# Patient Record
Sex: FEMALE | Race: WHITE | Employment: PART TIME | URBAN - METROPOLITAN AREA
[De-identification: names, ages, dates, MRNs, and addresses within clinical notes are randomized per-mention and may not be internally consistent; named-entity substitution may affect disease eponyms.]

---

## 2017-03-07 ENCOUNTER — TRANSFERRED RECORDS (OUTPATIENT)
Dept: HEALTH INFORMATION MANAGEMENT | Facility: CLINIC | Age: 22
End: 2017-03-07

## 2017-03-07 LAB
CHLAMYDIA - HIM PATIENT REPORTED: NEGATIVE
PAP SMEAR - HIM PATIENT REPORTED: NEGATIVE

## 2017-03-10 ENCOUNTER — TRANSFERRED RECORDS (OUTPATIENT)
Dept: HEALTH INFORMATION MANAGEMENT | Facility: CLINIC | Age: 22
End: 2017-03-10

## 2017-04-04 ENCOUNTER — TRANSFERRED RECORDS (OUTPATIENT)
Dept: HEALTH INFORMATION MANAGEMENT | Facility: CLINIC | Age: 22
End: 2017-04-04

## 2017-06-17 ENCOUNTER — HEALTH MAINTENANCE LETTER (OUTPATIENT)
Age: 22
End: 2017-06-17

## 2018-02-21 ENCOUNTER — TRANSFERRED RECORDS (OUTPATIENT)
Dept: HEALTH INFORMATION MANAGEMENT | Facility: CLINIC | Age: 23
End: 2018-02-21

## 2018-02-21 LAB
C TRACH DNA SPEC QL PROBE+SIG AMP: NEGATIVE
N GONORRHOEA DNA SPEC QL PROBE+SIG AMP: NEGATIVE
SPECIMEN DESCRIP: NORMAL
SPECIMEN DESCRIPTION: NORMAL

## 2018-04-16 ENCOUNTER — OFFICE VISIT (OUTPATIENT)
Dept: PEDIATRICS | Facility: CLINIC | Age: 23
End: 2018-04-16
Payer: COMMERCIAL

## 2018-04-16 VITALS
HEIGHT: 61 IN | HEART RATE: 76 BPM | SYSTOLIC BLOOD PRESSURE: 100 MMHG | TEMPERATURE: 97.3 F | OXYGEN SATURATION: 99 % | WEIGHT: 105.3 LBS | DIASTOLIC BLOOD PRESSURE: 58 MMHG | BODY MASS INDEX: 19.88 KG/M2

## 2018-04-16 DIAGNOSIS — L70.0 ACNE VULGARIS: ICD-10-CM

## 2018-04-16 DIAGNOSIS — Z00.01 ENCOUNTER FOR GENERAL ADULT MEDICAL EXAMINATION WITH ABNORMAL FINDINGS: Primary | ICD-10-CM

## 2018-04-16 DIAGNOSIS — R53.83 OTHER FATIGUE: ICD-10-CM

## 2018-04-16 LAB
BASOPHILS # BLD AUTO: 0 10E9/L (ref 0–0.2)
BASOPHILS NFR BLD AUTO: 0.3 %
DIFFERENTIAL METHOD BLD: ABNORMAL
EOSINOPHIL # BLD AUTO: 0 10E9/L (ref 0–0.7)
EOSINOPHIL NFR BLD AUTO: 0.8 %
ERYTHROCYTE [DISTWIDTH] IN BLOOD BY AUTOMATED COUNT: 14.2 % (ref 10–15)
HCT VFR BLD AUTO: 37.6 % (ref 35–47)
HGB BLD-MCNC: 12 G/DL (ref 11.7–15.7)
LYMPHOCYTES # BLD AUTO: 1.3 10E9/L (ref 0.8–5.3)
LYMPHOCYTES NFR BLD AUTO: 36.2 %
MCH RBC QN AUTO: 26.5 PG (ref 26.5–33)
MCHC RBC AUTO-ENTMCNC: 31.9 G/DL (ref 31.5–36.5)
MCV RBC AUTO: 83 FL (ref 78–100)
MONOCYTES # BLD AUTO: 0.2 10E9/L (ref 0–1.3)
MONOCYTES NFR BLD AUTO: 5.9 %
NEUTROPHILS # BLD AUTO: 2 10E9/L (ref 1.6–8.3)
NEUTROPHILS NFR BLD AUTO: 56.8 %
PLATELET # BLD AUTO: 203 10E9/L (ref 150–450)
RBC # BLD AUTO: 4.52 10E12/L (ref 3.8–5.2)
WBC # BLD AUTO: 3.5 10E9/L (ref 4–11)

## 2018-04-16 PROCEDURE — 83550 IRON BINDING TEST: CPT | Performed by: PEDIATRICS

## 2018-04-16 PROCEDURE — 80053 COMPREHEN METABOLIC PANEL: CPT | Performed by: PEDIATRICS

## 2018-04-16 PROCEDURE — 85025 COMPLETE CBC W/AUTO DIFF WBC: CPT | Performed by: PEDIATRICS

## 2018-04-16 PROCEDURE — 99385 PREV VISIT NEW AGE 18-39: CPT | Performed by: PEDIATRICS

## 2018-04-16 PROCEDURE — 36415 COLL VENOUS BLD VENIPUNCTURE: CPT | Performed by: PEDIATRICS

## 2018-04-16 PROCEDURE — 82728 ASSAY OF FERRITIN: CPT | Performed by: PEDIATRICS

## 2018-04-16 PROCEDURE — 84443 ASSAY THYROID STIM HORMONE: CPT | Performed by: PEDIATRICS

## 2018-04-16 PROCEDURE — 83540 ASSAY OF IRON: CPT | Performed by: PEDIATRICS

## 2018-04-16 PROCEDURE — 99213 OFFICE O/P EST LOW 20 MIN: CPT | Mod: 25 | Performed by: PEDIATRICS

## 2018-04-16 PROCEDURE — 82306 VITAMIN D 25 HYDROXY: CPT | Performed by: PEDIATRICS

## 2018-04-16 RX ORDER — DESONIDE 0.5 MG/G
CREAM TOPICAL
COMMUNITY
Start: 2018-03-12 | End: 2020-08-12

## 2018-04-16 ASSESSMENT — ENCOUNTER SYMPTOMS
PARESTHESIAS: 1
PALPITATIONS: 0
NAUSEA: 1
SHORTNESS OF BREATH: 1
DIZZINESS: 1
ARTHRALGIAS: 1
MYALGIAS: 1
HEARTBURN: 1
DYSURIA: 0
COUGH: 1
FEVER: 0
FREQUENCY: 0
HEMATURIA: 0
CHILLS: 0
DIARRHEA: 1
JOINT SWELLING: 0
WEAKNESS: 1
ABDOMINAL PAIN: 1
EYE PAIN: 1
SORE THROAT: 1
HEADACHES: 1
NERVOUS/ANXIOUS: 1
HEMATOCHEZIA: 0
CONSTIPATION: 1

## 2018-04-16 ASSESSMENT — ANXIETY QUESTIONNAIRES
5. BEING SO RESTLESS THAT IT IS HARD TO SIT STILL: NOT AT ALL
3. WORRYING TOO MUCH ABOUT DIFFERENT THINGS: MORE THAN HALF THE DAYS
GAD7 TOTAL SCORE: 10
1. FEELING NERVOUS, ANXIOUS, OR ON EDGE: MORE THAN HALF THE DAYS
7. FEELING AFRAID AS IF SOMETHING AWFUL MIGHT HAPPEN: SEVERAL DAYS
IF YOU CHECKED OFF ANY PROBLEMS ON THIS QUESTIONNAIRE, HOW DIFFICULT HAVE THESE PROBLEMS MADE IT FOR YOU TO DO YOUR WORK, TAKE CARE OF THINGS AT HOME, OR GET ALONG WITH OTHER PEOPLE: SOMEWHAT DIFFICULT
6. BECOMING EASILY ANNOYED OR IRRITABLE: MORE THAN HALF THE DAYS
2. NOT BEING ABLE TO STOP OR CONTROL WORRYING: MORE THAN HALF THE DAYS

## 2018-04-16 ASSESSMENT — PATIENT HEALTH QUESTIONNAIRE - PHQ9: 5. POOR APPETITE OR OVEREATING: SEVERAL DAYS

## 2018-04-16 NOTE — MR AVS SNAPSHOT
After Visit Summary   4/16/2018    Marta Quintanilla    MRN: [id number]           Patient Information     Date Of Birth          1995        Visit Information        Provider Department      4/16/2018 11:20 AM Sania Kurtz MD [facility]        Today's Diagnoses     Other fatigue    -  1      Care Instructions    Will get your records.    Start with labwork for fatigue and GI symptoms.      Preventive Health Recommendations  Female Ages 18 to 25     Yearly exam:     See your health care provider every year in order to  o Review health changes.   o Discuss preventive care.    o Review your medicines if your doctor has prescribed any.      You should be tested each year for STDs (sexually transmitted diseases).       After age 20, talk to your provider about how often you should have cholesterol testing.      Starting at age 21, get a Pap test every three years. If you have an abnormal result, your doctor may have you test more often.      If you are at risk for diabetes, you should have a diabetes test (fasting glucose).     Shots:     Get a flu shot each year.     Get a tetanus shot every 10 years.     Consider getting the shot (vaccine) that prevents cervical cancer (Gardasil).    Nutrition:     Eat at least 5 servings of fruits and vegetables each day.    Eat whole-grain bread, whole-wheat pasta and brown rice instead of white grains and rice.    Talk to your provider about Calcium and Vitamin D.     Lifestyle    Exercise at least 150 minutes a week each week (30 minutes a day, 5 days a week). This will help you control your weight and prevent disease.    Limit alcohol to one drink per day.    No smoking.     Wear sunscreen to prevent skin cancer.    See your dentist every six months for an exam and cleaning.          Follow-ups after your visit        Who to contact     If you have questions or need follow up information about today's clinic visit or your schedule please  "contact [facility] directly at [phone].  Normal or non-critical lab and imaging results will be communicated to you by MyChart, letter or phone within 4 business days after the clinic has received the results. If you do not hear from us within 7 days, please contact the clinic through [employer] or phone. If you have a critical or abnormal lab result, we will notify you by phone as soon as possible.  Submit refill requests through [employer] or call your pharmacy and they will forward the refill request to us. Please allow 3 business days for your refill to be completed.          Additional Information About Your Visit        [employer] Information     [employer] gives you secure access to your electronic health record. If you see a primary care provider, you can also send messages to your care team and make appointments. If you have questions, please call your primary care clinic.  If you do not have a primary care provider, please call [phone] and they will assist you.        Care EveryWhere ID     This is your Care EveryWhere ID. This could be used by other organizations to access your [facility] medical records  [id number]        Your Vitals Were     Pulse Temperature Height Pulse Oximetry BMI (Body Mass Index)       76 97.3  F (36.3  C) (Oral) 5' 1\" (1.549 m) 99% 19.9 kg/m2        Blood Pressure from Last 3 Encounters:   04/16/18 100/58   07/10/14 102/64   05/24/14 90/60    Weight from Last 3 Encounters:   04/16/18 105 lb 4.8 oz (47.8 kg)   07/10/14 105 lb 11.2 oz (47.9 kg) (10 %)*   05/24/14 104 lb 1.6 oz (47.2 kg) (8 %)*     * Growth percentiles are based on CDC 2-20 Years data.              We Performed the Following     CBC with platelets differential     Comprehensive metabolic panel     Ferritin     Iron and iron binding capacity     TSH with free T4 reflex     Vitamin D Deficiency        Primary Care Provider Office Phone # Fax #    Sania Kurtz [phone] [place] [address]        Equal Access to Services     [facility] : Hadii aad ku hadmaynorroger Rivas, wagloria green, qacheco davis. So [facility] [phone].    ATENCIÓN: Si habla español, tiene a waters disposición servicios gratuitos de asistencia lingüística. Llame al [phone].    We comply with applicable federal civil rights laws and [place] laws. We do not discriminate on the basis of race, color, national origin, age, disability, sex, sexual orientation, or gender identity.            Thank you!     [facility]  [facility] your [facility]. Our goal is always to provide you with excellent care. Hearing back from our patients is one way we can continue to improve our services. Please take a few minutes to complete the written survey that you may receive in the mail after your visit with us. Thank you!             Your Updated Medication List - Protect others around you: Learn how to safely use, store and throw away your medicines at www.disposemymeds.org.          This list is accurate as of 4/16/18 12:27 PM.  Always use your most recent med list.                   Brand Name Dispense Instructions for use Diagnosis    clindamycin 1 % lotion    CLEOCIN T          desonide 0.05 % cream    DESOWEN      Other fatigue       tretinoin 0.025 % cream    RETIN-A

## 2018-04-16 NOTE — PATIENT INSTRUCTIONS
Will get your records.    Start with labwork for fatigue and GI symptoms.      Preventive Health Recommendations  Female Ages 18 to 25     Yearly exam:     See your health care provider every year in order to  o Review health changes.   o Discuss preventive care.    o Review your medicines if your doctor has prescribed any.      You should be tested each year for STDs (sexually transmitted diseases).       After age 20, talk to your provider about how often you should have cholesterol testing.      Starting at age 21, get a Pap test every three years. If you have an abnormal result, your doctor may have you test more often.      If you are at risk for diabetes, you should have a diabetes test (fasting glucose).     Shots:     Get a flu shot each year.     Get a tetanus shot every 10 years.     Consider getting the shot (vaccine) that prevents cervical cancer (Gardasil).    Nutrition:     Eat at least 5 servings of fruits and vegetables each day.    Eat whole-grain bread, whole-wheat pasta and brown rice instead of white grains and rice.    Talk to your provider about Calcium and Vitamin D.     Lifestyle    Exercise at least 150 minutes a week each week (30 minutes a day, 5 days a week). This will help you control your weight and prevent disease.    Limit alcohol to one drink per day.    No smoking.     Wear sunscreen to prevent skin cancer.    See your dentist every six months for an exam and cleaning.
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
## 2018-04-17 ENCOUNTER — MYC MEDICAL ADVICE (OUTPATIENT)
Dept: PEDIATRICS | Facility: CLINIC | Age: 23
End: 2018-04-17

## 2018-04-17 LAB
ALBUMIN SERPL-MCNC: 4.2 G/DL (ref 3.4–5)
ALP SERPL-CCNC: 53 U/L (ref 40–150)
ALT SERPL W P-5'-P-CCNC: 12 U/L (ref 0–50)
ANION GAP SERPL CALCULATED.3IONS-SCNC: 9 MMOL/L (ref 3–14)
AST SERPL W P-5'-P-CCNC: 13 U/L (ref 0–45)
BILIRUB SERPL-MCNC: 0.3 MG/DL (ref 0.2–1.3)
BUN SERPL-MCNC: 12 MG/DL (ref 7–30)
CALCIUM SERPL-MCNC: 9.2 MG/DL (ref 8.5–10.1)
CHLORIDE SERPL-SCNC: 104 MMOL/L (ref 94–109)
CO2 SERPL-SCNC: 24 MMOL/L (ref 20–32)
CREAT SERPL-MCNC: 0.75 MG/DL (ref 0.52–1.04)
DEPRECATED CALCIDIOL+CALCIFEROL SERPL-MC: 30 UG/L (ref 20–75)
FERRITIN SERPL-MCNC: 30 NG/ML (ref 12–150)
GFR SERPL CREATININE-BSD FRML MDRD: >90 ML/MIN/1.7M2
GLUCOSE SERPL-MCNC: 74 MG/DL (ref 70–99)
IRON SATN MFR SERPL: 16 % (ref 15–46)
IRON SERPL-MCNC: 57 UG/DL (ref 35–180)
POTASSIUM SERPL-SCNC: 4 MMOL/L (ref 3.4–5.3)
PROT SERPL-MCNC: 7.4 G/DL (ref 6.8–8.8)
SODIUM SERPL-SCNC: 137 MMOL/L (ref 133–144)
TIBC SERPL-MCNC: 350 UG/DL (ref 240–430)

## 2018-04-17 ASSESSMENT — ANXIETY QUESTIONNAIRES: GAD7 TOTAL SCORE: 10

## 2018-04-17 ASSESSMENT — PATIENT HEALTH QUESTIONNAIRE - PHQ9: SUM OF ALL RESPONSES TO PHQ QUESTIONS 1-9: 16

## 2018-04-18 ENCOUNTER — TRANSFERRED RECORDS (OUTPATIENT)
Dept: HEALTH INFORMATION MANAGEMENT | Facility: CLINIC | Age: 23
End: 2018-04-18

## 2018-04-18 LAB — TSH SERPL DL<=0.005 MIU/L-ACNC: 0.74 MU/L (ref 0.4–4)

## 2018-04-18 NOTE — TELEPHONE ENCOUNTER
Patient was just seen for a physical and she is due for a tetanus shot, per HM.  Advised patient to schedule a nurse only appointment to get this vaccine.  Estefany Valladares RN  Message handled by Nurse Triage.

## 2018-05-01 ENCOUNTER — TRANSFERRED RECORDS (OUTPATIENT)
Dept: HEALTH INFORMATION MANAGEMENT | Facility: CLINIC | Age: 23
End: 2018-05-01

## 2018-11-06 ENCOUNTER — NURSE TRIAGE (OUTPATIENT)
Dept: NURSING | Facility: CLINIC | Age: 23
End: 2018-11-06

## 2018-11-06 ENCOUNTER — OFFICE VISIT (OUTPATIENT)
Dept: URGENT CARE | Facility: URGENT CARE | Age: 23
End: 2018-11-06
Payer: COMMERCIAL

## 2018-11-06 VITALS
TEMPERATURE: 97.9 F | HEART RATE: 95 BPM | SYSTOLIC BLOOD PRESSURE: 111 MMHG | DIASTOLIC BLOOD PRESSURE: 69 MMHG | OXYGEN SATURATION: 96 %

## 2018-11-06 DIAGNOSIS — Z11.3 SCREEN FOR STD (SEXUALLY TRANSMITTED DISEASE): ICD-10-CM

## 2018-11-06 DIAGNOSIS — N90.89 VULVAR IRRITATION: Primary | ICD-10-CM

## 2018-11-06 DIAGNOSIS — R30.0 DYSURIA: ICD-10-CM

## 2018-11-06 LAB
ALBUMIN UR-MCNC: NEGATIVE MG/DL
APPEARANCE UR: CLEAR
BILIRUB UR QL STRIP: NEGATIVE
COLOR UR AUTO: YELLOW
GLUCOSE UR STRIP-MCNC: NEGATIVE MG/DL
HGB UR QL STRIP: NEGATIVE
KETONES UR STRIP-MCNC: NEGATIVE MG/DL
LEUKOCYTE ESTERASE UR QL STRIP: NEGATIVE
NITRATE UR QL: NEGATIVE
PH UR STRIP: 7 PH (ref 5–7)
SOURCE: NORMAL
SP GR UR STRIP: 1.01 (ref 1–1.03)
SPECIMEN SOURCE: NORMAL
UROBILINOGEN UR STRIP-ACNC: 0.2 EU/DL (ref 0.2–1)
WET PREP SPEC: NORMAL

## 2018-11-06 PROCEDURE — 99213 OFFICE O/P EST LOW 20 MIN: CPT | Performed by: FAMILY MEDICINE

## 2018-11-06 PROCEDURE — 87491 CHLMYD TRACH DNA AMP PROBE: CPT | Performed by: FAMILY MEDICINE

## 2018-11-06 PROCEDURE — 87210 SMEAR WET MOUNT SALINE/INK: CPT | Performed by: FAMILY MEDICINE

## 2018-11-06 PROCEDURE — 87591 N.GONORRHOEAE DNA AMP PROB: CPT | Performed by: FAMILY MEDICINE

## 2018-11-06 PROCEDURE — 81003 URINALYSIS AUTO W/O SCOPE: CPT | Performed by: FAMILY MEDICINE

## 2018-11-06 RX ORDER — FLUCONAZOLE 150 MG/1
150 TABLET ORAL ONCE
Qty: 1 TABLET | Refills: 0 | Status: SHIPPED | OUTPATIENT
Start: 2018-11-06 | End: 2018-11-06

## 2018-11-06 NOTE — MR AVS SNAPSHOT
After Visit Summary   11/6/2018    Marta Quintanilla    MRN: [id number]           Patient Information     Date Of Birth          1995        Visit Information        Provider Department      11/6/2018 12:30 PM Juli Valladares MD [facility]        Today's Diagnoses     Vulvar irritation    -  1    Dysuria        Screen for STD (sexually transmitted disease)           Follow-ups after your visit        Who to contact     If you have questions or need follow up information about today's clinic visit or your schedule please contact [facility] directly at [phone].  Normal or non-critical lab and imaging results will be communicated to you by MyChart, letter or phone within 4 business days after the clinic has received the results. If you do not hear from us within 7 days, please contact the clinic through [employer] or phone. If you have a critical or abnormal lab result, we will notify you by phone as soon as possible.  Submit refill requests through [employer] or call your pharmacy and they will forward the refill request to us. Please allow 3 business days for your refill to be completed.          Additional Information About Your Visit        MyChart Information     [employer] gives you secure access to your electronic health record. If you see a primary care provider, you can also send messages to your care team and make appointments. If you have questions, please call your primary care clinic.  If you do not have a primary care provider, please call [phone] and they will assist you.        Care EveryWhere ID     This is your Care EveryWhere ID. This could be used by other organizations to access your [facility] medical records  [id number]        Your Vitals Were     Pulse Temperature Pulse Oximetry             95 97.9  F (36.6  C) (Tympanic) 96%          Blood Pressure from Last 3 Encounters:   11/06/18 111/69   04/16/18 100/58   07/10/14 102/64    Weight from  Last 3 Encounters:   04/16/18 105 lb 4.8 oz (47.8 kg)   07/10/14 105 lb 11.2 oz (47.9 kg) (10 %)*   05/24/14 104 lb 1.6 oz (47.2 kg) (8 %)*     * Growth percentiles are based on [facility] 2-20 Years data.              We Performed the Following     Chlamydia trachomatis PCR     Neisseria gonorrhoeae PCR     UA reflex to Microscopic and Culture     Wet prep          Today's Medication Changes          These changes are accurate as of 11/6/18 11:59 PM.  If you have any questions, ask your nurse or doctor.               Start taking these medicines.        Dose/Directions    fluconazole 150 MG tablet   Commonly known as:  DIFLUCAN   Used for:  Vulvar irritation   Started by:  Juli Valladares MD        Dose:  150 mg   Take 1 tablet (150 mg) by mouth once for 1 dose   Quantity:  1 tablet   Refills:  0            Where to get your medicines      These medications were sent to [id number] - [place], [place] - [address] AT [address]  [address], [place] [address]     Phone:  [phone]     fluconazole 150 MG tablet                Primary Care Provider Office Phone # Fax #    Sania Kurtz [phone] [address] [place] [address]        Equal Access to Services     [facility]: Hadii aad ku hadasho Soomaali, waaxda luqadaha, qaybta kaalmada adeegyada, waxay evelynein hayaan mariam pichardo . So [facility] [phone].    ATENCIÓN: Si habla español, tiene a waters disposición servicios gratuitos de asistencia lingüística. Llame al [phone].    We comply with applicable federal civil rights laws and [place] laws. We do not discriminate on the basis of race, color, national origin, age, disability, sex, sexual orientation, or gender identity.            Thank you!     Thank you for choosing [facility] [place] [facility]  for your care. Our goal is always to provide you with excellent care. Hearing back from our patients is one way we can continue to  improve our services. Please take a few minutes to complete the written survey that you may receive in the mail after your visit with us. Thank you!             Your Updated Medication List - Protect others around you: Learn how to safely use, store and throw away your medicines at www.disposemymeds.org.          This list is accurate as of 11/6/18 11:59 PM.  Always use your most recent med list.                   Brand Name Dispense Instructions for use Diagnosis    clindamycin 1 % lotion    CLEOCIN T          desonide 0.05 % cream    DESOWEN      Other fatigue       fluconazole 150 MG tablet    DIFLUCAN    1 tablet    Take 1 tablet (150 mg) by mouth once for 1 dose    Vulvar irritation       tretinoin 0.025 % cream    RETIN-A

## 2018-11-06 NOTE — TELEPHONE ENCOUNTER
"Marta states that she was seen today at Urgent Care clinic. She states that she was given a prescription for Diflucan and now she has a new symptom of bleeding described as \"not enough to fill a pad but like a clot with white discharge in it.\"  \"Should I still take the medication?\"    FNA contacted [facility], Spoke with Chen who verified with Juli Valladares MD  To relay message to patient, \"please continue with taking the Diflucan tonight. The discahrge is likely breaking down the skin and that is why there may be a little bleeding.\"     Disposition: Provided message to Marta who verbalized understanding. RN advised to call back with any changes, worsening of symptoms, and questions or concerns.     Zuri Clemons RN/FNA      Reason for Disposition    [1] Caller requesting NON-URGENT health information AND [2] PCP's office is the best resource    Additional Information    Negative: [1] Caller is not with the adult (patient) AND [2] reporting urgent symptoms    Negative: Lab result questions    Negative: Medication questions    Negative: Caller cannot be reached by phone    Negative: Caller has already spoken to PCP or another triager    Negative: RN needs further essential information from caller in order to complete triage    Negative: Requesting regular office appointment    Protocols used: INFORMATION ONLY CALL-ADULT-      "

## 2018-11-07 LAB
C TRACH DNA SPEC QL NAA+PROBE: NEGATIVE
N GONORRHOEA DNA SPEC QL NAA+PROBE: NEGATIVE
SPECIMEN SOURCE: NORMAL
SPECIMEN SOURCE: NORMAL

## 2018-11-14 NOTE — PROGRESS NOTES
SUBJECTIVE:  Marta Quintanilla is a 23 year old female who presents with vaginal discomfort.    Onset of symptoms close to a week ago, gradually worsening since.     Pain:burning in and around vagina.     Vaginal bleeding: No      Vaginal symptoms: local irritation  No LMP recorded.    Trying treating what felt like a yeast infection with Monistat OTC, which helped a bit but now feels like symptoms are worse than before that treatment.    Past Medical History:   Diagnosis Date     Personal history of urinary (tract) infection     urology evaluation OK     Current Outpatient Prescriptions   Medication Sig Dispense Refill     clindamycin (CLEOCIN T) 1 % lotion   6     desonide (DESOWEN) 0.05 % cream        tretinoin (RETIN-A) 0.025 % cream        Social History     Social History     Marital status: Single     Spouse name: N/A     Number of children: N/A     Years of education: N/A     Occupational History     Not on file.     Social History Main Topics     Smoking status: Never Smoker     Smokeless tobacco: Never Used     Alcohol use Yes      Comment: rarely     Drug use: No     Sexual activity: Yes     Partners: Male     Other Topics Concern     Not on file     Social History Narrative       ROS:   No fevers.  No abdominal/pelvic pain.    OBJECTIVE:  /69 (BP Location: Right arm, Patient Position: Chair, Cuff Size: Adult Regular)  Pulse 95  Temp 97.9  F (36.6  C) (Tympanic)  SpO2 96%  Pt did self wet prep-- exam deferred.  GENERAL APPEARANCE: healthy, alert and no distress    UA negative.  Wet prep negative.  GC and chlamydia pending.    ASSESSMENT:  Vulvar irritation, suspect partially treated yeast infection  No evidence of UTI at this time.    PLAN:  Fluconazole 150 mg x 1 dose.  Follow up with primary MD if not improving over the next week.

## 2019-02-21 ENCOUNTER — OFFICE VISIT (OUTPATIENT)
Dept: PEDIATRICS | Facility: CLINIC | Age: 24
End: 2019-02-21
Payer: COMMERCIAL

## 2019-02-21 VITALS
OXYGEN SATURATION: 99 % | DIASTOLIC BLOOD PRESSURE: 52 MMHG | HEART RATE: 87 BPM | TEMPERATURE: 97.6 F | SYSTOLIC BLOOD PRESSURE: 98 MMHG | WEIGHT: 104 LBS | HEIGHT: 61 IN | BODY MASS INDEX: 19.63 KG/M2

## 2019-02-21 DIAGNOSIS — Z13.1 SCREENING FOR DIABETES MELLITUS: ICD-10-CM

## 2019-02-21 DIAGNOSIS — Z13.6 SCREENING FOR CARDIOVASCULAR CONDITION: ICD-10-CM

## 2019-02-21 DIAGNOSIS — Z00.00 ROUTINE GENERAL MEDICAL EXAMINATION AT A HEALTH CARE FACILITY: Primary | ICD-10-CM

## 2019-02-21 DIAGNOSIS — Z23 NEED FOR TETANUS BOOSTER: ICD-10-CM

## 2019-02-21 DIAGNOSIS — M79.641 PAIN IN BOTH HANDS: ICD-10-CM

## 2019-02-21 DIAGNOSIS — M79.642 PAIN IN BOTH HANDS: ICD-10-CM

## 2019-02-21 PROCEDURE — 90715 TDAP VACCINE 7 YRS/> IM: CPT | Performed by: PEDIATRICS

## 2019-02-21 PROCEDURE — 99395 PREV VISIT EST AGE 18-39: CPT | Performed by: PEDIATRICS

## 2019-02-21 PROCEDURE — 90471 IMMUNIZATION ADMIN: CPT | Performed by: PEDIATRICS

## 2019-02-21 ASSESSMENT — ENCOUNTER SYMPTOMS
HEMATOCHEZIA: 0
ARTHRALGIAS: 1
HEADACHES: 0
PALPITATIONS: 0
BREAST MASS: 1
DIZZINESS: 0
FEVER: 0
WEAKNESS: 0
EYE PAIN: 0
PARESTHESIAS: 0
COUGH: 0
CHILLS: 0
HEARTBURN: 1
NAUSEA: 0
DYSURIA: 0
JOINT SWELLING: 1
CONSTIPATION: 0
SHORTNESS OF BREATH: 0
ABDOMINAL PAIN: 0
HEMATURIA: 0
SORE THROAT: 0
FREQUENCY: 0
DIARRHEA: 0
MYALGIAS: 1
NERVOUS/ANXIOUS: 1

## 2019-02-21 ASSESSMENT — MIFFLIN-ST. JEOR: SCORE: 1164.12

## 2019-02-21 NOTE — NURSING NOTE
Screening Questionnaire for Adult Immunization    Are you sick today?   No   Do you have allergies to medications, food, a vaccine component or latex?   No   Have you ever had a serious reaction after receiving a vaccination?   No   Do you have a long-term health problem with heart disease, lung disease, asthma, kidney disease, metabolic disease (e.g. diabetes), anemia, or other blood disorder?   No   Do you have cancer, leukemia, HIV/AIDS, or any other immune system problem?   No   In the past 3 months, have you taken medications that affect  your immune system, such as prednisone, other steroids, or anticancer drugs; drugs for the treatment of rheumatoid arthritis, Crohn s disease, or psoriasis; or have you had radiation treatments?   No   Have you had a seizure, or a brain or other nervous system problem?   No   During the past year, have you received a transfusion of blood or blood     products, or been given immune (gamma) globulin or antiviral drug?   No   For women: Are you pregnant or is there a chance you could become        pregnant during the next month?   No   Have you received any vaccinations in the past 4 weeks?   No     Immunization questionnaire answers were all negative.        Per orders of Dr. Kurtz, injection of Kurtz given by Mackenzie Shannon. Patient instructed to remain in clinic for 15 minutes afterwards, and to report any adverse reaction to me immediately.       Screening performed by Mackenzie Shannon on 2/21/2019 at 10:37 AM.
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
## 2019-02-25 DIAGNOSIS — Z13.6 SCREENING FOR CARDIOVASCULAR CONDITION: ICD-10-CM

## 2019-02-25 DIAGNOSIS — Z13.1 SCREENING FOR DIABETES MELLITUS: ICD-10-CM

## 2019-02-25 LAB
CHOLEST SERPL-MCNC: 130 MG/DL
GLUCOSE SERPL-MCNC: 84 MG/DL (ref 70–99)
HDLC SERPL-MCNC: 76 MG/DL
LDLC SERPL CALC-MCNC: 48 MG/DL
NONHDLC SERPL-MCNC: 54 MG/DL
TRIGL SERPL-MCNC: 32 MG/DL

## 2019-02-25 PROCEDURE — 80061 LIPID PANEL: CPT | Performed by: PEDIATRICS

## 2019-02-25 PROCEDURE — 82947 ASSAY GLUCOSE BLOOD QUANT: CPT | Performed by: PEDIATRICS

## 2019-02-25 PROCEDURE — 36415 COLL VENOUS BLD VENIPUNCTURE: CPT | Performed by: PEDIATRICS

## 2019-02-26 ENCOUNTER — THERAPY VISIT (OUTPATIENT)
Dept: OCCUPATIONAL THERAPY | Facility: CLINIC | Age: 24
End: 2019-02-26
Attending: PEDIATRICS
Payer: COMMERCIAL

## 2019-02-26 DIAGNOSIS — M79.602 BILATERAL ARM PAIN: ICD-10-CM

## 2019-02-26 DIAGNOSIS — M79.641 PAIN IN BOTH HANDS: ICD-10-CM

## 2019-02-26 DIAGNOSIS — M79.641 BILATERAL HAND PAIN: ICD-10-CM

## 2019-02-26 DIAGNOSIS — M79.601 BILATERAL ARM PAIN: ICD-10-CM

## 2019-02-26 DIAGNOSIS — M79.642 PAIN IN BOTH HANDS: ICD-10-CM

## 2019-02-26 DIAGNOSIS — M79.642 BILATERAL HAND PAIN: ICD-10-CM

## 2019-02-26 PROCEDURE — 97166 OT EVAL MOD COMPLEX 45 MIN: CPT | Mod: GO | Performed by: OCCUPATIONAL THERAPIST

## 2019-02-26 PROCEDURE — 97110 THERAPEUTIC EXERCISES: CPT | Mod: GO | Performed by: OCCUPATIONAL THERAPIST

## 2019-02-26 NOTE — PROGRESS NOTES
Hand Therapy Initial Evaluation    Current Date:  2/26/2019    Diagnosis: B hand pain  Date of onset: 2 years ago  MD order date: 2/21/19     Subjective:  Marta Quintanilla is a 23 year old R hand dominant female.    Patient reports symptoms of pain, weakness/loss of strength, numbness and tingling  of the bilateral hands, R worse than left, which occurred due to working with her hands as an . Since onset symptoms are Unchanged, and depends on schedule at work.  Special tests:  none.  Previous treatment: OTC splints.    General health as reported by patient is good.  Pertinent medical history includes:Depression, Numbness/Tingling  Medical allergies:none.  Surgical history: other: wisdom teeth, ear tubes.  Medication history: None.    Occupational Profile Information:  Current occupation is an   Currently working in normal job without restrictions  Job Tasks: Lifting, Carrying, Prolonged Standing, Repetitive Tasks  Prior functional level:  no limitations  Barriers include:none  Mobility: No difficulty  Transportation: drives  Leisure activities/hobbies: household chores, carrying groceries    Functional Outcome Measure:   Upper Extremity Functional Index Score:  SCORE:   Column Totals: /80: 75   (A lower score indicates greater disability.)    Objective:  Pain Level Report: On scale 0-10/10  Date 2/26/2019    side B    At Rest 0-1    With Activity 7-8      Primary Report: location and description  Date 2/26/2019    Side B    Location Thumbs, pinkys, palms, up both sides of forearms, up to neck    Radiation Through forearms and neck    Pain Quality Dull, shooting, achy, sharp    Frequency Intermittant    Duration Day or night, difficult falling asleep when work is busy    Exacerbated by  massage, lifting grocery bags, opening doors, chopping vegetables    Relieved by Rest, CBD cream, stretching    Progression since onset Unchanged, ups an downs depending on job      Edema:  Pt notes that  fingers do appear thicker when work schedule is busy. No apparent swelling at time of evaluation.  Sensation: Pt notes tingling through palms of hands near A1s. Not sure if paresthesias progress to tips of fingers.    ROM:  Full AROM of wrists, fingers, and elbows. Pt reports thumbs get stiff at the end of a busy work week.    Special Tests:  Pain Report:  - none    + mild    ++ moderate    +++ severe   Date 2/26/19 2/26/19   Side L R   Phalens Mild pain at base of thumb on L -   Tinels at CT sore -   Tinels at Pronator Sensation in thumb -   Elbow flexion Dull pain at base of thumb -   Valentina Tingling, shooting into wrist tingling       STRENGTH: (Measured in pounds, pain scale 0-10/10)    Date 2/26/2019        Trials Left Right Left Right Left Right Left Right Left Right Left Right   1 30 33             2               3               Avg               Pain 1-2                3 Point Pinch  Date 2/26/2019        Trials Left Right Left Right Left Right Left Right Left Right Left Right   1 10 10             2               3               Avg               Pain                 Lateral Pinch  Date 2/26/2019        Trials Left Right Left Right Left Right Left Right Left Right Left Right   1 12 12.5             2               3               Avg               Pain                   Assessment:  Patient presents with symptoms consistent with diagnosis of B hand pain,  with conservative intervention.     Patient's limitations or Problem List includes:  Pain, Weakness, Decreased , Decreased pinch and Tightness in musculature of the bilateral hand, thumb, index finger, long finger, ring finger and small finger which interferes with the patient's ability to perform Self Care Tasks (weight bearing on hands), Work Tasks, Recreational Activities and Household Chores as compared to previous level of function.    Rehab Potential:  Excellent - Return to full activity, no limitations    Patient will benefit from skilled  Occupational Therapy to increase  strength, pinch strength and sensation and decrease pain to return to previous activity level and resume normal daily tasks and to reach their rehab potential.    Barriers to Learning:  No barrier    Communication Issues:  Patient appears to be able to clearly communicate and understand verbal and written communication and follow directions correctly.    Chart Review: Chart Review, Brief history including review of medical and/or therapy records relating to the presenting problem and Simple history review with patient    Identified Performance Deficits: home establishment and management, shopping, work and leisure activities    Assessment of Occupational Performance:  3-5 Performance Deficits    Clinical Decision Making (Complexity): Moderate complexity    Treatment Explanation:  The following has been discussed with the patient:  RX ordered/plan of care  Anticipated outcomes  Possible risks and side effects    Plan:  Frequency:  1 X week, once daily  Duration:  for 8 weeks    Treatment Plan:   Modalities:  US  Therapeutic Exercise:  AROM, Tendon Gliding, Isotonics and Isometrics  Neuromuscular re-education:  Nerve Gliding, Isometrics and Stabilization  Manual Techniques:  Myofascial release  Orthotic Fabrication:  Static orthosis  Discharge Plan:  Achieve all LTG.  Independent in home treatment program.  Reach maximal therapeutic benefit.    Home Exercise Program:  T-towel stretch  Pec clock stretch at the wall  Median n glide  Warmth PRN    Next Visit:  Massage to palm, thenars, teach for HEP  Nerve gliding  MFR/STM    Future: consider wrist splints, education on posture  - scapular retraction  -foam roller program

## 2019-03-05 ENCOUNTER — THERAPY VISIT (OUTPATIENT)
Dept: OCCUPATIONAL THERAPY | Facility: CLINIC | Age: 24
End: 2019-03-05
Payer: COMMERCIAL

## 2019-03-05 DIAGNOSIS — M79.602 BILATERAL ARM PAIN: ICD-10-CM

## 2019-03-05 DIAGNOSIS — M79.642 BILATERAL HAND PAIN: ICD-10-CM

## 2019-03-05 DIAGNOSIS — M79.641 BILATERAL HAND PAIN: ICD-10-CM

## 2019-03-05 DIAGNOSIS — M79.601 BILATERAL ARM PAIN: ICD-10-CM

## 2019-03-05 PROCEDURE — 97112 NEUROMUSCULAR REEDUCATION: CPT | Mod: GO | Performed by: OCCUPATIONAL THERAPIST

## 2019-03-05 PROCEDURE — 97140 MANUAL THERAPY 1/> REGIONS: CPT | Mod: GO | Performed by: OCCUPATIONAL THERAPIST

## 2019-03-05 PROCEDURE — 97110 THERAPEUTIC EXERCISES: CPT | Mod: GO | Performed by: OCCUPATIONAL THERAPIST

## 2019-03-12 ENCOUNTER — THERAPY VISIT (OUTPATIENT)
Dept: OCCUPATIONAL THERAPY | Facility: CLINIC | Age: 24
End: 2019-03-12
Payer: COMMERCIAL

## 2019-03-12 DIAGNOSIS — M79.641 BILATERAL HAND PAIN: ICD-10-CM

## 2019-03-12 DIAGNOSIS — M79.601 BILATERAL ARM PAIN: ICD-10-CM

## 2019-03-12 DIAGNOSIS — M79.602 BILATERAL ARM PAIN: ICD-10-CM

## 2019-03-12 DIAGNOSIS — M79.642 BILATERAL HAND PAIN: ICD-10-CM

## 2019-03-12 PROCEDURE — 97140 MANUAL THERAPY 1/> REGIONS: CPT | Mod: GO | Performed by: OCCUPATIONAL THERAPIST

## 2019-03-12 PROCEDURE — 97112 NEUROMUSCULAR REEDUCATION: CPT | Mod: GO | Performed by: OCCUPATIONAL THERAPIST

## 2019-03-12 PROCEDURE — 97110 THERAPEUTIC EXERCISES: CPT | Mod: GO | Performed by: OCCUPATIONAL THERAPIST

## 2019-03-19 ENCOUNTER — THERAPY VISIT (OUTPATIENT)
Dept: OCCUPATIONAL THERAPY | Facility: CLINIC | Age: 24
End: 2019-03-19
Payer: COMMERCIAL

## 2019-03-19 DIAGNOSIS — M79.641 BILATERAL HAND PAIN: ICD-10-CM

## 2019-03-19 DIAGNOSIS — M79.642 BILATERAL HAND PAIN: ICD-10-CM

## 2019-03-19 DIAGNOSIS — M79.602 BILATERAL ARM PAIN: ICD-10-CM

## 2019-03-19 DIAGNOSIS — M79.601 BILATERAL ARM PAIN: ICD-10-CM

## 2019-03-19 PROCEDURE — 97140 MANUAL THERAPY 1/> REGIONS: CPT | Mod: GO | Performed by: OCCUPATIONAL THERAPIST

## 2019-03-19 PROCEDURE — 97112 NEUROMUSCULAR REEDUCATION: CPT | Mod: GO | Performed by: OCCUPATIONAL THERAPIST

## 2019-03-19 PROCEDURE — 97110 THERAPEUTIC EXERCISES: CPT | Mod: GO | Performed by: OCCUPATIONAL THERAPIST

## 2019-03-25 ENCOUNTER — TRANSFERRED RECORDS (OUTPATIENT)
Dept: HEALTH INFORMATION MANAGEMENT | Facility: CLINIC | Age: 24
End: 2019-03-25

## 2019-04-02 ENCOUNTER — THERAPY VISIT (OUTPATIENT)
Dept: OCCUPATIONAL THERAPY | Facility: CLINIC | Age: 24
End: 2019-04-02
Payer: COMMERCIAL

## 2019-04-02 DIAGNOSIS — M79.642 BILATERAL HAND PAIN: ICD-10-CM

## 2019-04-02 DIAGNOSIS — M79.641 BILATERAL HAND PAIN: ICD-10-CM

## 2019-04-02 DIAGNOSIS — M79.601 BILATERAL ARM PAIN: ICD-10-CM

## 2019-04-02 DIAGNOSIS — M79.602 BILATERAL ARM PAIN: ICD-10-CM

## 2019-04-02 PROCEDURE — 97110 THERAPEUTIC EXERCISES: CPT | Mod: GO | Performed by: OCCUPATIONAL THERAPIST

## 2019-04-02 PROCEDURE — 97140 MANUAL THERAPY 1/> REGIONS: CPT | Mod: GO | Performed by: OCCUPATIONAL THERAPIST

## 2019-04-02 PROCEDURE — 97112 NEUROMUSCULAR REEDUCATION: CPT | Mod: GO | Performed by: OCCUPATIONAL THERAPIST

## 2019-04-09 ENCOUNTER — THERAPY VISIT (OUTPATIENT)
Dept: OCCUPATIONAL THERAPY | Facility: CLINIC | Age: 24
End: 2019-04-09
Payer: COMMERCIAL

## 2019-04-09 DIAGNOSIS — M79.642 BILATERAL HAND PAIN: ICD-10-CM

## 2019-04-09 DIAGNOSIS — M79.602 BILATERAL ARM PAIN: ICD-10-CM

## 2019-04-09 DIAGNOSIS — M79.641 BILATERAL HAND PAIN: ICD-10-CM

## 2019-04-09 DIAGNOSIS — M79.601 BILATERAL ARM PAIN: ICD-10-CM

## 2019-04-09 PROCEDURE — 97140 MANUAL THERAPY 1/> REGIONS: CPT | Mod: GO | Performed by: OCCUPATIONAL THERAPIST

## 2019-04-09 PROCEDURE — 97112 NEUROMUSCULAR REEDUCATION: CPT | Mod: GO | Performed by: OCCUPATIONAL THERAPIST

## 2019-04-09 PROCEDURE — 97110 THERAPEUTIC EXERCISES: CPT | Mod: GO | Performed by: OCCUPATIONAL THERAPIST

## 2019-04-23 ENCOUNTER — THERAPY VISIT (OUTPATIENT)
Dept: OCCUPATIONAL THERAPY | Facility: CLINIC | Age: 24
End: 2019-04-23
Payer: COMMERCIAL

## 2019-04-23 DIAGNOSIS — M79.602 BILATERAL ARM PAIN: ICD-10-CM

## 2019-04-23 DIAGNOSIS — M79.642 BILATERAL HAND PAIN: ICD-10-CM

## 2019-04-23 DIAGNOSIS — M79.601 BILATERAL ARM PAIN: ICD-10-CM

## 2019-04-23 DIAGNOSIS — M79.641 BILATERAL HAND PAIN: ICD-10-CM

## 2019-04-23 PROCEDURE — 97110 THERAPEUTIC EXERCISES: CPT | Mod: GO | Performed by: OCCUPATIONAL THERAPIST

## 2019-04-23 PROCEDURE — 97140 MANUAL THERAPY 1/> REGIONS: CPT | Mod: GO | Performed by: OCCUPATIONAL THERAPIST

## 2019-04-23 NOTE — PROGRESS NOTES
Progress Note - Hand Therapy  Reporting period is 2/26/19 to 4/23/19.  Current Date:  2/26/2019    Diagnosis: B hand pain  Date of onset: 2 years ago  MD order date: 2/21/19   Subjectve:   Subjective changes as noted by patient:  The hands are doing good, hardly any pain  Functional changes noted by patient:  Improvement in Self Care Tasks (eating), Work Tasks and Household Chores  Patient has noted adverse reaction to:  None    Objective:  Changes noted in objective findings: Yes, see below    Pain Level Report: On scale 0-10/10  Date 2/26/2019 4/22/19   side B B   At Rest 0-1 0/10   With Activity 7-8 0-1/10     Primary Report: location and description  Date 2/26/2019 4/22/19   Side B B   Location Thumbs, pinkys, palms, up both sides of forearms, up to neck Thenars, dorsal hands   Radiation Through forearms and neck None   Pain Quality Dull, shooting, achy, sharp Dull, numb   Frequency Intermittant Intermittant   Duration Day or night, difficult falling asleep when work is busy Day   Exacerbated by  massage, lifting grocery bags, opening doors, chopping vegetables work   Relieved by Rest, CBD cream, stretching exercises   Progression since onset Unchanged, ups an downs depending on job Gradually better     Edema:  Pt notes that fingers do appear thicker when work schedule is busy. No apparent swelling at time of evaluation.    Sensation: Pt notes tingling through palms of hands near A1s. Not sure if paresthesias progress to tips of fingers.  4/22/19: very intermittant tingling    ROM:  Full AROM of wrists, fingers, and elbows. Pt reports thumbs get stiff at the end of a busy work week.    Special Tests:  Pain Report:  - none    + mild    ++ moderate    +++ severe   Date 2/26/19 2/26/19   Side L R   Phalens Mild pain at base of thumb on L -   Tinels at CT sore -   Tinels at Pronator Sensation in thumb -   Elbow flexion Dull pain at base of thumb -   Valentina Tingling, shooting into wrist tingling       STRENGTH:  (Measured in pounds, pain scale 0-10/10)    Date 2/26/2019 4/22/19       Trials Left Right Left Right Left Right Left Right Left Right Left Right   1 30 33 45 42           2               3               Avg               Pain 1-2  0 0             3 Point Pinch  Date 2/26/2019 4/22/19       Trials Left Right Left Right Left Right Left Right Left Right Left Right   1 10 10 11 12           2               3               Avg               Pain   0 0             Lateral Pinch  Date 2/26/2019 4/22/19       Trials Left Right Left Right Left Right Left Right Left Right Left Right   1 12 12.5 13 15           2               3               Avg               Pain                   Assessment:  Response to therapy has been improvement to:  Strength:   and pinch  Pain:  frequency is less, intensity of pain is decreased, duration of pain is decreased and less tender over affected area  Paresthesias:  Median nerve - less intense numbness and tingling    Overall Assessment:  Patient's symptoms are resolving.  Patient is progressing well and is ready to decrease frequency of treatment in the clinic.  Patient is becoming more independent in home exercise program  Patient would benefit from continued therapy to achieve rehab potential  STG/LTG:  STGoals have been reviewed;  see goal sheet for details and updates.  LTGoals have been reviewed;  see goal sheet for details and updates.    I have re-evaluated this patient and find that the nature, scope, duration and intensity of the therapy is appropriate for the medical condition of the patient.    Plan:  Frequency:  1 X week, once daily, 1-2x per month  Duration:  for 4 months    Treatment Plan:   Modalities:  US  Therapeutic Exercise:  AROM, Tendon Gliding, Isotonics and Isometrics  Neuromuscular re-education:  Nerve Gliding, Isometrics and Stabilization  Manual Techniques:  Myofascial release  Orthotic Fabrication:  Static orthosis  Discharge Plan:  Achieve all  LTG.  Independent in home treatment program.  Reach maximal therapeutic benefit.    Home Exercise Program:  T-towel stretch  Pec clock stretch at the wall  Median n glide, rad nerve glide  CMC stabilization  Massage to forearms, thenars  Warmth PRN  Scapular stability, strengthening    Next Visit:  Massage to palm, thenars  Nerve gliding  MFR/STM

## 2019-06-25 ENCOUNTER — THERAPY VISIT (OUTPATIENT)
Dept: OCCUPATIONAL THERAPY | Facility: CLINIC | Age: 24
End: 2019-06-25
Payer: COMMERCIAL

## 2019-06-25 DIAGNOSIS — M79.601 BILATERAL ARM PAIN: ICD-10-CM

## 2019-06-25 DIAGNOSIS — M79.602 BILATERAL ARM PAIN: ICD-10-CM

## 2019-06-25 DIAGNOSIS — M79.641 BILATERAL HAND PAIN: ICD-10-CM

## 2019-06-25 DIAGNOSIS — M79.642 BILATERAL HAND PAIN: ICD-10-CM

## 2019-06-25 PROCEDURE — 97535 SELF CARE MNGMENT TRAINING: CPT | Mod: GO | Performed by: OCCUPATIONAL THERAPIST

## 2019-06-25 PROCEDURE — 97140 MANUAL THERAPY 1/> REGIONS: CPT | Mod: GO | Performed by: OCCUPATIONAL THERAPIST

## 2019-06-25 PROCEDURE — 97110 THERAPEUTIC EXERCISES: CPT | Mod: GO | Performed by: OCCUPATIONAL THERAPIST

## 2019-06-25 NOTE — PROGRESS NOTES
Progress Note - Hand Therapy  Reporting period is  4/23/19 to 6/25/19.  Current Date:  6/26/2019    Diagnosis: B hand pain  Date of onset: 2 years ago  MD order date: 2/21/19   Subjectve:   Subjective changes as noted by patient:  Things have gotten worse. I moved, and I also have been busy with work. I haven't been regularly doing exercises.  Functional changes noted by patient: Worsening of household tasks, work tasks  Patient has noted adverse reaction to:  None    Objective:  Changes noted in objective findings: Yes, see below    Pain Level Report: On scale 0-10/10  Date 2/26/2019 4/22/19 6/25/19   side B B B   At Rest 0-1 0/10 0/10   With Activity 7-8 0-1/10 7/10     Primary Report: location and description  Date 2/26/2019 4/22/19 6/25/19   Side B B B   Location Thumbs, pinkys, palms, up both sides of forearms, up to neck Thenars, dorsal hands Thenars, Dorsal finger, both sides of forearms, to neck   Radiation Through forearms and neck None To neck   Pain Quality Dull, shooting, achy, sharp Dull, numb Achy, sharp   Frequency Intermittant Intermittant intermittant   Duration Day or night, difficult falling asleep when work is busy Day Day, sore in the morning   Exacerbated by  massage, lifting grocery bags, opening doors, chopping vegetables work  massage, lifting grocery bags, opening doors, chopping vegetables   Relieved by Rest, CBD cream, stretching exercises exercises   Progression since onset Unchanged, ups an downs depending on job Gradually better Getting worse     Edema:  None    Sensation: Pt notes tingling through palms of hands near A1s. Not sure if paresthesias progress to tips of fingers.  4/22/19: very intermittant tingling  6/25/19: tingling present while working, tingling through palms and volar wrist    ROM:  Full AROM of wrists, fingers, and elbows. Pt reports thumbs get stiff at the end of a busy work week.    Special Tests:  Pain Report:  - none    + mild    ++ moderate    +++ severe   Date  2/26/19 2/26/19 6/25 6/25   Side L R L R   Phalens Mild pain at base of thumb on L - - -   Tinels at CT sore - - -   Tinels at Pronator Sensation in thumb - NT NT   Elbow flexion Dull pain at base of thumb - NT NT   Valentina Tingling, shooting into wrist tingling Pain beginning ~1 min Dull, numb feeling at ~40 sec     Tenderness: pain 0-10/10  Palpation 6/25/2019   Side R   Extensor Wad 7/10   LEP 0/10     STRENGTH: (Measured in pounds, pain scale 0-10/10)    Date 2/26/2019 4/22/19 6/25      Trials Left Right Left Right Left Right Left Right Left Right Left Right   1 30 33 45 42 42 42         2               3               Avg               Pain 1-2  0 0 0 0           3 Point Pinch  Date 2/26/2019 4/22/19 6/25      Trials Left Right Left Right Left Right Left Right Left Right Left Right   1 10 10 11 12 10 10         2               3               Avg               Pain   0 0             Lateral Pinch  Date 2/26/2019 4/22/19       Trials Left Right Left Right Left Right Left Right Left Right Left Right   1 12 12.5 13 15           2               3               Avg               Pain                   Assessment:  Response to therapy has been decline in:  Pain:  frequency is more, intensity of pain has increased, duration of pain is increased and more tender over affected area  Work Performance:  Decreased ability to perform daily work tasks, due to pain  Paresthesias:  Median nerve - more intense numbness and tingling    Overall Assessment:  Patient has experienced an exacerbation of symptoms.  STG/LTG:  STGoals have been reviewed;  see goal sheet for details and updates.  LTGoals have been reviewed;  see goal sheet for details and updates.    I have re-evaluated this patient and find that the nature, scope, duration and intensity of the therapy is appropriate for the medical condition of the patient.    Plan:  Frequency:  1 X week, once daily, 2-3x per month  Duration:  for 3 months    Treatment Plan:    Modalities:  US  Therapeutic Exercise:  AROM, Tendon Gliding, Isotonics and Isometrics  Neuromuscular re-education:  Nerve Gliding, Isometrics and Stabilization  Manual Techniques:  Myofascial release  Orthotic Fabrication:  Static orthosis  Discharge Plan:  Achieve all LTG.  Independent in home treatment program.  Reach maximal therapeutic benefit.    Home Exercise Program:  Median n glide, rad nerve glide  Massage to forearms, thenars  Ice PRN    Next Visit:  Massage to palm, thenars  Nerve gliding  MFR/STM  Progress towards foam roller program

## 2019-07-16 ENCOUNTER — THERAPY VISIT (OUTPATIENT)
Dept: OCCUPATIONAL THERAPY | Facility: CLINIC | Age: 24
End: 2019-07-16
Payer: COMMERCIAL

## 2019-07-16 DIAGNOSIS — M79.641 BILATERAL HAND PAIN: ICD-10-CM

## 2019-07-16 DIAGNOSIS — M79.601 BILATERAL ARM PAIN: ICD-10-CM

## 2019-07-16 DIAGNOSIS — M79.602 BILATERAL ARM PAIN: ICD-10-CM

## 2019-07-16 DIAGNOSIS — M79.642 BILATERAL HAND PAIN: ICD-10-CM

## 2019-07-16 PROCEDURE — 97110 THERAPEUTIC EXERCISES: CPT | Mod: GO | Performed by: OCCUPATIONAL THERAPIST

## 2019-07-16 PROCEDURE — 97112 NEUROMUSCULAR REEDUCATION: CPT | Mod: GO | Performed by: OCCUPATIONAL THERAPIST

## 2019-07-16 PROCEDURE — 97140 MANUAL THERAPY 1/> REGIONS: CPT | Mod: GO | Performed by: OCCUPATIONAL THERAPIST

## 2019-08-06 ENCOUNTER — THERAPY VISIT (OUTPATIENT)
Dept: OCCUPATIONAL THERAPY | Facility: CLINIC | Age: 24
End: 2019-08-06
Payer: COMMERCIAL

## 2019-08-06 DIAGNOSIS — M79.642 BILATERAL HAND PAIN: ICD-10-CM

## 2019-08-06 DIAGNOSIS — M79.641 BILATERAL HAND PAIN: ICD-10-CM

## 2019-08-06 DIAGNOSIS — M79.601 BILATERAL ARM PAIN: ICD-10-CM

## 2019-08-06 DIAGNOSIS — M79.602 BILATERAL ARM PAIN: ICD-10-CM

## 2019-08-06 PROCEDURE — 97110 THERAPEUTIC EXERCISES: CPT | Mod: GO | Performed by: OCCUPATIONAL THERAPIST

## 2019-08-06 PROCEDURE — 97112 NEUROMUSCULAR REEDUCATION: CPT | Mod: GO | Performed by: OCCUPATIONAL THERAPIST

## 2019-08-06 PROCEDURE — 97140 MANUAL THERAPY 1/> REGIONS: CPT | Mod: GO | Performed by: OCCUPATIONAL THERAPIST

## 2019-09-16 ENCOUNTER — THERAPY VISIT (OUTPATIENT)
Dept: OCCUPATIONAL THERAPY | Facility: CLINIC | Age: 24
End: 2019-09-16
Payer: COMMERCIAL

## 2019-09-16 DIAGNOSIS — M79.601 BILATERAL ARM PAIN: ICD-10-CM

## 2019-09-16 DIAGNOSIS — M79.602 BILATERAL ARM PAIN: ICD-10-CM

## 2019-09-16 DIAGNOSIS — M79.642 BILATERAL HAND PAIN: ICD-10-CM

## 2019-09-16 DIAGNOSIS — M79.641 BILATERAL HAND PAIN: ICD-10-CM

## 2019-09-16 PROCEDURE — 97110 THERAPEUTIC EXERCISES: CPT | Mod: GO | Performed by: OCCUPATIONAL THERAPIST

## 2019-09-16 PROCEDURE — 97112 NEUROMUSCULAR REEDUCATION: CPT | Mod: GO | Performed by: OCCUPATIONAL THERAPIST

## 2019-09-16 PROCEDURE — 97140 MANUAL THERAPY 1/> REGIONS: CPT | Mod: GO | Performed by: OCCUPATIONAL THERAPIST

## 2019-09-16 NOTE — PROGRESS NOTES
Progress Note - Hand Therapy  Reporting period is  6/25/19 to 9/16/19  Current Date:  9/16/2019    Diagnosis: B hand pain  Date of onset: 2 years ago  MD order date: 2/21/19     Subjectve:   Subjective changes as noted by patient:  Overall things are gradually better. It feels better when I stay up on the exercises. It was a little worse recently after coming back from vacation though.  Functional changes noted by patient: Easier to do work tasks  Patient has noted adverse reaction to:  None    Objective:  Changes noted in objective findings: Yes, see below    Pain Level Report: On scale 0-10/10  Date 2/26/2019 4/22/19 6/25/19 9/16   side B B B B   At Rest 0-1 0/10 0/10 0/10   With Activity 7-8 0-1/10 7/10 4-5/10     Primary Report: location and description  Date 2/26/2019 4/22/19 6/25/19 9/16   Side B B B B   Location Thumbs, pinkys, palms, up both sides of forearms, up to neck Thenars, dorsal hands Thenars, Dorsal finger, both sides of forearms, to neck Thenars, occasionally  Dorsal forearms   Radiation Through forearms and neck None To neck none   Pain Quality Dull, shooting, achy, sharp Dull, numb Achy, sharp achy   Frequency Intermittant Intermittant intermittant intermittant   Duration Day or night, difficult falling asleep when work is busy Day Day, sore in the morning Day, after work   Exacerbated by  massage, lifting grocery bags, opening doors, chopping vegetables work  massage, lifting grocery bags, opening doors, chopping vegetables massage, lifting grocery bags, opening doors, chopping vegetables   Relieved by Rest, CBD cream, stretching exercises exercises Exercises   Progression since onset Unchanged, ups an downs depending on job Gradually better Getting worse Slowly better     Edema:  None    Sensation: Pt notes tingling through palms of hands near A1s. Not sure if paresthesias progress to tips of fingers.  4/22/19: very intermittant tingling  6/25/19: tingling present while working, tingling  through palms and volar wrist  9/16/19: pt notes occasional tingling through palm, volar wrist.     ROM:  Full AROM of wrists, fingers, and elbows. Pt reports thumbs get stiff at the end of a busy work week.    Special Tests:  Pain Report:  - none    + mild    ++ moderate    +++ severe   Date 2/26/19 2/26/19 6/25 6/25   Side L R L R   Phalens Mild pain at base of thumb on L - - -   Tinels at CT sore - - -   Tinels at Pronator Sensation in thumb - NT NT   Elbow flexion Dull pain at base of thumb - NT NT   Valentina Tingling, shooting into wrist tingling Pain beginning ~1 min Dull, numb feeling at ~40 sec     Tenderness: pain 0-10/10  Palpation 6/25/2019 9/16   Side R R   Extensor Wad 7/10 2-3/10   LEP 0/10 2-3/10     STRENGTH: (Measured in pounds, pain scale 0-10/10)    Date 2/26/2019 4/22/19 6/25 9/16     Trials Left Right Left Right Left Right Left Right Left Right Left Right   1 30 33 45 42 42 42 47 54       2               3               Avg               Pain 1-2  0 0 0 0 0 0         3 Point Pinch  Date 2/26/2019 4/22/19 6/25      Trials Left Right Left Right Left Right Left Right Left Right Left Right   1 10 10 11 12 10 10         2               3               Avg               Pain   0 0             Lateral Pinch  Date 2/26/2019 4/22/19       Trials Left Right Left Right Left Right Left Right Left Right Left Right   1 12 12.5 13 15           2               3               Avg               Pain                   Assessment:  Response to therapy has been improvement to:  Strength:   and pinch  Pain:  frequency is less, intensity of pain is decreased, duration of pain is decreased and less tender over affected area  Paresthesias:  Median nerve - less intense numbness and tingling and smaller area of involvement  Radial nerve - less intense numbness and tingling    Overall Assessment:  Patient's symptoms are resolving.  Patient is progressing well and is ready to decrease frequency of treatment in the  clinic.  Patient would benefit from continued therapy to achieve rehab potential  STG/LTG:  STGoals have been reviewed;  see goal sheet for details and updates.  LTGoals have been reviewed;  see goal sheet for details and updates.    I have re-evaluated this patient and find that the nature, scope, duration and intensity of the therapy is appropriate for the medical condition of the patient.    Plan:  Frequency:  1 X week, once daily, 1x per month  Duration:  for 3 months    Treatment Plan:   Modalities:  US  Therapeutic Exercise:  AROM, Tendon Gliding, Isotonics and Isometrics  Neuromuscular re-education:  Nerve Gliding, Isometrics and Stabilization  Manual Techniques:  Myofascial release  Orthotic Fabrication:  Static orthosis  Discharge Plan:  Achieve all LTG.  Independent in home treatment program.  Reach maximal therapeutic benefit.    Home Exercise Program:  Median n glide, rad nerve glide  Massage to forearms, thenars  Foam roller program - pec aníbal and minor  Wall stretch to pecs    Next Visit:  Massage to palm, thenars  Nerve gliding  MFR/STM  Progress foam roller program

## 2019-10-01 ENCOUNTER — HEALTH MAINTENANCE LETTER (OUTPATIENT)
Age: 24
End: 2019-10-01

## 2019-12-09 PROBLEM — M79.601 BILATERAL ARM PAIN: Status: RESOLVED | Noted: 2019-02-26 | Resolved: 2019-12-09

## 2019-12-09 PROBLEM — M79.602 BILATERAL ARM PAIN: Status: RESOLVED | Noted: 2019-02-26 | Resolved: 2019-12-09

## 2019-12-09 PROBLEM — M79.642 BILATERAL HAND PAIN: Status: RESOLVED | Noted: 2019-02-26 | Resolved: 2019-12-09

## 2019-12-09 PROBLEM — M79.641 BILATERAL HAND PAIN: Status: RESOLVED | Noted: 2019-02-26 | Resolved: 2019-12-09

## 2020-02-03 ENCOUNTER — THERAPY VISIT (OUTPATIENT)
Dept: OCCUPATIONAL THERAPY | Facility: CLINIC | Age: 25
End: 2020-02-03
Payer: COMMERCIAL

## 2020-02-03 DIAGNOSIS — M79.641 BILATERAL HAND PAIN: Primary | ICD-10-CM

## 2020-02-03 DIAGNOSIS — M79.642 BILATERAL HAND PAIN: Primary | ICD-10-CM

## 2020-02-03 PROCEDURE — 97140 MANUAL THERAPY 1/> REGIONS: CPT | Mod: GO | Performed by: OCCUPATIONAL THERAPIST

## 2020-02-03 PROCEDURE — 97110 THERAPEUTIC EXERCISES: CPT | Mod: GO | Performed by: OCCUPATIONAL THERAPIST

## 2020-02-03 PROCEDURE — 97168 OT RE-EVAL EST PLAN CARE: CPT | Mod: GO | Performed by: OCCUPATIONAL THERAPIST

## 2020-02-03 NOTE — PROGRESS NOTES
Hand Therapy Re-Evaluation  Current Date:  2/3/2020    Subjective:  Marta Quintanilla is a 24 year old R hand dominant female.    Diagnosis: B hand pain  Date of onset: 2 years ago  MD order date: 2/21/19    Patient reports symptoms of pain, weakness/loss of strength, numbness and tingling  of the B hands and elbows which occurred due to Repetitive use at work. Since onset symptoms are gradually getting worse. Special tests:  none.  Previous treatment: hand therapy, chiropractor. General health as reported by patient is good.  Pertinent medical history includes: Concussions/Dizziness, Numbness/Tingling.  Medical allergies: none.  Surgical history: other: wisdom teeth, ear tubes.  Medication history: None.    Occupational Profile Information:  Current occupation is   Currently working in normal job without restrictions  Job Tasks: Lifting, Carrying, Prolonged Sitting, Prolonged Standing, Pushing, Pulling, Repetitive Tasks  Prior functional level:  no limitations  Barriers include:none  Mobility: No difficulty  Transportation: drives  Leisure activities/hobbies: snowboarding, working out    Upper Extremity Functional Index Score:  SCORE:   Column Totals: /80: 67   (A lower score indicates greater disability.)    Objective:    Pain Level Report: On scale 0-10/10  Date 2/26/2019 4/22/19 6/25/19 9/16 2/3/20 2/3/10   side B B B B R L   At Rest 0-1 0/10 0/10 0/10 0-1/10 0-1/10   With Activity 7-8 0-1/10 7/10 4-5/10 Hand:5-6/10  Elbow: 2-3/10 Hand: 3-4/10  Elbow: 1-2/10     Primary Report: location and description  Date 2/26/2019 4/22/19 6/25/19 9/16 2/3/20   Side B B B B B   Location Thumbs, pinkys, palms, up both sides of forearms, up to neck Thenars, dorsal hands Thenars, Dorsal finger, both sides of forearms, to neck Thenars, occasionally  Dorsal forearms R: thenars, LEP and extensors  L: Hypothenars, thenars, LEP   Radiation Through forearms and neck None To neck none none   Pain Quality Dull, shooting,  achy, sharp Dull, numb Achy, sharp achy Sore, numb, tingling, Sharp   Frequency Intermittant Intermittant intermittant intermittant intermittant   Duration Day or night, difficult falling asleep when work is busy Day Day, sore in the morning Day, after work Day   Exacerbated by  massage, lifting grocery bags, opening doors, chopping vegetables work  massage, lifting grocery bags, opening doors, chopping vegetables massage, lifting grocery bags, opening doors, chopping vegetables Repetive movement at work, opening door, chopping vegetables, lifting groceries   Relieved by Rest, CBD cream, stretching exercises exercises Exercises Exercises, rest, ice, chiropractor, massage   Progression since onset Unchanged, ups an downs depending on job Gradually better Getting worse Slowly better Slowly worsening     Edema:  None    Sensation: Pt notes tingling through palms of hands near A1s. Not sure if paresthesias progress to tips of fingers.  4/22/19: very intermittant tingling  6/25/19: tingling present while working, tingling through palms and volar wrist  9/16/19: pt notes occasional tingling through palm, volar wrist.   2/3/20: Tingling is intermittent through L hypothenars, and intermittent through dorsal hands    ROM:  Full AROM of wrists, fingers, and elbows. Pt reports thumbs get stiff at the end of a busy work week.    Special Tests:  Pain Report:  - none    + mild    ++ moderate    +++ severe   Date 2/26/19 2/26/19 6/25 6/25 2/3/20 2/3/20   Side L R L R L R   Phalens Mild pain at base of thumb on L - - - NT NT   Tinels at CT sore - - - NT NT   Tinels at Pronator Sensation in thumb - NT NT NT NT   Elbow flexion Dull pain at base of thumb - NT NT - -   Valentina Tingling, shooting into wrist tingling Pain beginning ~1 min Dull, numb feeling at ~40 sec NT NT   Tinels at [place]     - -   Tinels at cubital tunnel     - -              Resisted Testing  Pain Report: 0-10/10  Date 2/3/2020 2/3/2020   Side R L   Supination   0/10 0/10   Pronation 0/10 0/10   Wrist Ext with RD, Elbow at side 0/10 0/10   Wrist Ext with UD, Elbow at side 0/10 0/10   Wrist Ext with RD, Elbow Ext 0/10 0/10   Wrist Ext with UD, Elbow Ext 0/10 0/10   Wrist Flex with RD, Elbow at side 0/10 0/10   Wrist Flex with UD, Elbow at side 0/10 0/10   EDC with Elbow at side 0/10 0/10   EDC with Elbow Ext 0/10 0/10     Tenderness: pain 0-10/10  Palpation 6/25/2019 9/16   Side R R   Extensor Wad 7/10 2-3/10   LEP 0/10 2-3/10     Palpation  Pain Report: 0-10/10    2/3/2020 2/3/2020   Side R L   Triangular Interval 1/10 0/10   Pec Major 0 0   Spiral Groove 0 0   LEP 0 0   Radial Head 0 0   Extensor Wad 1/10 0   PIN Site 0 0     STRENGTH: (Measured in pounds, pain scale 0-10/10)    Date 2/26/2019 4/22/19 6/25 9/16 2/3/2020    Trials Left Right Left Right Left Right Left Right Left Right Left Right   1 30 33 45 42 42 42 47 54 48 55     2               3               Avg               Pain 1-2  0 0 0 0 0 0 0 0       3 Point Pinch  Date 2/26/2019 4/22/19 6/25 2/3/2020     Trials Left Right Left Right Left Right Left Right Left Right Left Right   1 10 10 11 12 10 10 10 12       2               3               Avg               Pain   0 0   0 0         Lateral Pinch  Date 2/26/2019 4/22/19 2/3/2020      Trials Left Right Left Right Left Right Left Right Left Right Left Right   1 12 12.5 13 15 14 14         2               3               Avg               Pain                   Assessment:  Patient presents with symptoms consistent with diagnosis of bilateral hand  pain,  with conservative intervention.     Patient's limitations or Problem List includes:  Pain and Tightness in musculature of the bilateral elbow and wrist which interferes with the patient's ability to perform Self Care Tasks (dressing, eating, bathing), Work Tasks, Recreational Activities and Household Chores as compared to previous level of function.    Rehab Potential:  Excellent - Return to full activity,  no limitations    Patient will benefit from skilled Occupational Therapy to increase overall strength and decrease pain to return to previous activity level and resume normal daily tasks and to reach their rehab potential.    Barriers to Learning:  No barrier    Communication Issues:  Patient appears to be able to clearly communicate and understand verbal and written communication and follow directions correctly.    Chart Review: Chart Review, Brief history including review of medical and/or therapy records relating to the presenting problem and Simple history review with patient    Identified Performance Deficits: health management and maintenance, home establishment and management, meal preparation and cleanup, shopping, work and leisure activities    Assessment of Occupational Performance:  5 or more Performance Deficits    Clinical Decision Making (Complexity): Low complexity    Treatment Explanation:  The following has been discussed with the patient:  RX ordered/plan of care  Anticipated outcomes  Possible risks and side effects    Plan:  Frequency:  1 X week, once daily  Duration:  for 6 weeks    Treatment Plan:   Modalities:  US  Therapeutic Exercise:  PROM, Tendon Gliding and Isotonics  Neuromuscular re-education:  Nerve Gliding and Kinesiotaping  Manual Techniques:  Friction massage and Myofascial release    Discharge Plan:  Achieve all LTG.  Independent in home treatment program.  Reach maximal therapeutic benefit.    Home Exercise Program:  Ulnar n glide, rad nerve glide  Massage to forearms, thenars  Warmth/Ice    Next Visit:  Massage to palm, thenars  Nerve gliding  MFR/STM  Posture, foam roller program  Forearm stretches

## 2020-02-18 ENCOUNTER — THERAPY VISIT (OUTPATIENT)
Dept: OCCUPATIONAL THERAPY | Facility: CLINIC | Age: 25
End: 2020-02-18
Payer: COMMERCIAL

## 2020-02-18 DIAGNOSIS — M79.642 BILATERAL HAND PAIN: ICD-10-CM

## 2020-02-18 DIAGNOSIS — M79.641 BILATERAL HAND PAIN: ICD-10-CM

## 2020-02-18 PROCEDURE — 97112 NEUROMUSCULAR REEDUCATION: CPT | Mod: GO | Performed by: OCCUPATIONAL THERAPIST

## 2020-02-18 PROCEDURE — 97110 THERAPEUTIC EXERCISES: CPT | Mod: GO | Performed by: OCCUPATIONAL THERAPIST

## 2020-02-18 PROCEDURE — 97140 MANUAL THERAPY 1/> REGIONS: CPT | Mod: GO | Performed by: OCCUPATIONAL THERAPIST

## 2020-02-19 DIAGNOSIS — M79.641 PAIN IN BOTH HANDS: Primary | ICD-10-CM

## 2020-02-19 DIAGNOSIS — M79.642 PAIN IN BOTH HANDS: Primary | ICD-10-CM

## 2020-02-24 ENCOUNTER — TRANSFERRED RECORDS (OUTPATIENT)
Dept: HEALTH INFORMATION MANAGEMENT | Facility: CLINIC | Age: 25
End: 2020-02-24

## 2020-03-10 ENCOUNTER — THERAPY VISIT (OUTPATIENT)
Dept: OCCUPATIONAL THERAPY | Facility: CLINIC | Age: 25
End: 2020-03-10
Payer: COMMERCIAL

## 2020-03-10 DIAGNOSIS — M79.642 BILATERAL HAND PAIN: ICD-10-CM

## 2020-03-10 DIAGNOSIS — M79.641 BILATERAL HAND PAIN: ICD-10-CM

## 2020-03-10 DIAGNOSIS — M79.641 PAIN IN BOTH HANDS: ICD-10-CM

## 2020-03-10 DIAGNOSIS — M79.642 PAIN IN BOTH HANDS: ICD-10-CM

## 2020-03-10 PROCEDURE — 97140 MANUAL THERAPY 1/> REGIONS: CPT | Mod: GO | Performed by: OCCUPATIONAL THERAPIST

## 2020-03-10 PROCEDURE — 97110 THERAPEUTIC EXERCISES: CPT | Mod: GO | Performed by: OCCUPATIONAL THERAPIST

## 2020-03-10 PROCEDURE — 97112 NEUROMUSCULAR REEDUCATION: CPT | Mod: GO | Performed by: OCCUPATIONAL THERAPIST

## 2020-03-17 ENCOUNTER — OFFICE VISIT (OUTPATIENT)
Dept: URGENT CARE | Facility: URGENT CARE | Age: 25
End: 2020-03-17
Payer: COMMERCIAL

## 2020-03-17 VITALS
OXYGEN SATURATION: 99 % | TEMPERATURE: 97.7 F | HEART RATE: 74 BPM | WEIGHT: 105 LBS | DIASTOLIC BLOOD PRESSURE: 64 MMHG | SYSTOLIC BLOOD PRESSURE: 102 MMHG | BODY MASS INDEX: 19.84 KG/M2

## 2020-03-17 DIAGNOSIS — R30.0 DYSURIA: ICD-10-CM

## 2020-03-17 DIAGNOSIS — B37.31 CANDIDIASIS OF VAGINA: Primary | ICD-10-CM

## 2020-03-17 DIAGNOSIS — Z11.3 SCREEN FOR STD (SEXUALLY TRANSMITTED DISEASE): ICD-10-CM

## 2020-03-17 LAB
ALBUMIN UR-MCNC: NEGATIVE MG/DL
APPEARANCE UR: CLEAR
BILIRUB UR QL STRIP: NEGATIVE
COLOR UR AUTO: YELLOW
GLUCOSE UR STRIP-MCNC: NEGATIVE MG/DL
HGB UR QL STRIP: NEGATIVE
KETONES UR STRIP-MCNC: NEGATIVE MG/DL
LEUKOCYTE ESTERASE UR QL STRIP: NEGATIVE
NITRATE UR QL: NEGATIVE
PH UR STRIP: 7 PH (ref 5–7)
SOURCE: NORMAL
SP GR UR STRIP: 1.02 (ref 1–1.03)
SPECIMEN SOURCE: ABNORMAL
UROBILINOGEN UR STRIP-ACNC: 0.2 EU/DL (ref 0.2–1)
WET PREP SPEC: ABNORMAL

## 2020-03-17 PROCEDURE — 81003 URINALYSIS AUTO W/O SCOPE: CPT | Performed by: FAMILY MEDICINE

## 2020-03-17 PROCEDURE — 87210 SMEAR WET MOUNT SALINE/INK: CPT | Performed by: FAMILY MEDICINE

## 2020-03-17 PROCEDURE — 99213 OFFICE O/P EST LOW 20 MIN: CPT | Performed by: FAMILY MEDICINE

## 2020-03-17 PROCEDURE — 87491 CHLMYD TRACH DNA AMP PROBE: CPT | Performed by: FAMILY MEDICINE

## 2020-03-17 PROCEDURE — 87591 N.GONORRHOEAE DNA AMP PROB: CPT | Performed by: FAMILY MEDICINE

## 2020-03-17 RX ORDER — FLUCONAZOLE 150 MG/1
150 TABLET ORAL ONCE
Qty: 2 TABLET | Refills: 0 | Status: SHIPPED | OUTPATIENT
Start: 2020-03-17 | End: 2020-08-12

## 2020-03-17 NOTE — PATIENT INSTRUCTIONS
Take one of fluconazole today.  If symptoms are still present in 3 days, take the other pill.  Otherwise just save it for another yeast infection down the road.    If the fluconazole is too expensive, you can use OTC clotrimazole cream in the vagina at bedtime for 7 nights instead.

## 2020-03-17 NOTE — PROGRESS NOTES
SUBJECTIVE:   Marta Quintanilla is a 24 year old female who  presents today for a possible UTI. Symptoms of dysuria have been going on for 3day(s).  Hematuria no.  gradual onset and still present and moderate.  There is no history of fever, chills, nausea or vomiting.  No history of vaginal discharge. This patient does have a history of urinary tract infections, including one treated with antibiotics about three weeks ago. Patient denies long duration, rigors and flank pain.    Past Medical History:   Diagnosis Date     Personal history of urinary (tract) infection     urology evaluation OK     Current Outpatient Medications   Medication Sig Dispense Refill     clindamycin (CLEOCIN T) 1 % lotion   6     desonide (DESOWEN) 0.05 % cream        tretinoin (RETIN-A) 0.025 % cream        Social History     Tobacco Use     Smoking status: Never Smoker     Smokeless tobacco: Never Used   Substance Use Topics     Alcohol use: Yes     Comment: rarely     ROS:   Review of systems negative except as stated above.    OBJECTIVE:  /64   Pulse 74   Temp 97.7  F (36.5  C) (Tympanic)   Wt 47.6 kg (105 lb)   LMP 03/09/2020   SpO2 99%   BMI 19.84 kg/m    GENERAL APPEARANCE: healthy, alert and no distress  SKIN: no suspicious lesions or rashes    Results for orders placed or performed in visit on 03/17/20   *UA reflex to Microscopic and Culture ([facility] (except [place] and [place])     Status: None    Specimen: Midstream Urine   Result Value Ref Range    Color Urine Yellow     Appearance Urine Clear     Glucose Urine Negative NEG^Negative mg/dL    Bilirubin Urine Negative NEG^Negative    Ketones Urine Negative NEG^Negative mg/dL    Specific Gravity Urine 1.020 1.003 - 1.035    Blood Urine Negative NEG^Negative    pH Urine 7.0 5.0 - 7.0 pH    Protein Albumin Urine Negative NEG^Negative mg/dL    Urobilinogen Urine 0.2 0.2 - 1.0 EU/dL    Nitrite Urine Negative NEG^Negative    Leukocyte Esterase Urine  Negative NEG^Negative    Source Midstream Urine    Wet prep     Status: Abnormal    Specimen: Vagina   Result Value Ref Range    Specimen Description Vagina     Wet Prep No Trichomonas seen     Wet Prep No clue cells seen     Wet Prep Few  Yeast seen   (A)     Wet Prep Few  WBC'S seen            ASSESSMENT:   Vaginal candidiasis  Appears that prior UTI has cleared based on completely normal UA.    PLAN:  Fluconazole 150 mg x 1.  Repeat in 3 days if not better.  STD swabs pending.  Will call patient if these turn positive.    Patient Instructions   Take one of fluconazole today.  If symptoms are still present in 3 days, take the other pill.  Otherwise just save it for another yeast infection down the road.    If the fluconazole is too expensive, you can use OTC clotrimazole cream in the vagina at bedtime for 7 nights instead.

## 2020-03-18 ENCOUNTER — TELEPHONE (OUTPATIENT)
Dept: PEDIATRICS | Facility: CLINIC | Age: 25
End: 2020-03-18

## 2020-03-18 NOTE — TELEPHONE ENCOUNTER
Called and left message for pt to call back the clinic. When and if pt calls back please schedule for July or anytime after.

## 2020-03-22 ENCOUNTER — HEALTH MAINTENANCE LETTER (OUTPATIENT)
Age: 25
End: 2020-03-22

## 2020-04-06 ENCOUNTER — TELEPHONE (OUTPATIENT)
Dept: PEDIATRICS | Facility: CLINIC | Age: 25
End: 2020-04-06

## 2020-04-06 NOTE — TELEPHONE ENCOUNTER
Called the Pt to discuss below.     Needs to be scheduled with Dr. Kurtz or resident per below sometime this week.     No answer, Left message. Please help her schedule with provider for bleeding hemorrhoids.     Yael Munguia RN   [facility] -- Triage Nurse

## 2020-04-06 NOTE — TELEPHONE ENCOUNTER
Called Pt to discuss below. No answer. Left message advising them to call back into clinic. Awaiting callback.     Yael Munguia RN   [facility] -- Triage Nurse

## 2020-04-06 NOTE — TELEPHONE ENCOUNTER
Dr. Kurtz:    Spoke with the Pt.     About 1.5 months ago, the Pt reports having a bad UTI. She was seen in the Urgency Room, diagnosed with a UTI but also had some rectal bleeding addressed at that visit as well. She was diagnosed with external hemorrhoid and advised to do hydrocortisone cream to the area and follow up with PCP if not improving.   She has had pretty consistent bleeding since then.     The Pt is able to see the area, and it looks like a swollen, external hemorrhoid protruding out of the anus. It is about the size of her pinky nail. Blood has been bright red.   The Pt reports the bleeding is mostly just during BM's. (toilet water is bright red), however over the last 9 days she has had some bleeding in between BM's.   On a few occasions she noticed drops of blood (small amount) on her underwear in the morning upon waking.   The area is painful to the touch, and also painful while sitting and walking.   She is having 2-3 BM's per day, normal and soft. Not difficult to pass outside of the pain.   No Hx of hemorrhoids before. No recent pregnancy or chronic hx of constipation.   She denies feeling dizzy or lightheaded.     She stopped the topical Cortizone has it seemed to make the bleeding worse. She has been using preparation H (both the topical cream and the wipes).    Please advise.     Yael Munguia, PAMELA   [facility] -- Triage Nurse

## 2020-04-06 NOTE — TELEPHONE ENCOUNTER
Symptoms  Describe your symptoms: Pain with bowel movements and bleeding for 9 days  Any pain: Yes:   How long have you been having symptoms: 9 days  days  Have you been seen for this:  Yes: Urgent Care 6 weeks ago  Appointment offered?: No  Triage offered?: Yes:   Home remedies tried: Creams Prep H, medicated wipes  Requested Pharmacy: [facility] [address] [facility] [address]  Okay to leave a detailed message? Yes at Cell number on file:    Telephone Information:   Mobile [phone]

## 2020-04-06 NOTE — TELEPHONE ENCOUNTER
She is someone who needs to be seen in person.    Please schedule sometime this week.  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    Sania Kurtz MD  Internal Medicine/Pediatrics  [facility]

## 2020-04-07 NOTE — TELEPHONE ENCOUNTER
Called and left message for patient requesting a call back. Also sent [employer] message requesting patient schedule an office visit. Addis Campa RN on 4/7/2020 at 10:15 AM

## 2020-04-09 ENCOUNTER — OFFICE VISIT (OUTPATIENT)
Dept: PEDIATRICS | Facility: CLINIC | Age: 25
End: 2020-04-09
Payer: COMMERCIAL

## 2020-04-09 VITALS
SYSTOLIC BLOOD PRESSURE: 102 MMHG | OXYGEN SATURATION: 99 % | HEIGHT: 62 IN | TEMPERATURE: 98.1 F | WEIGHT: 107.5 LBS | HEART RATE: 96 BPM | DIASTOLIC BLOOD PRESSURE: 70 MMHG | RESPIRATION RATE: 16 BRPM | BODY MASS INDEX: 19.78 KG/M2

## 2020-04-09 DIAGNOSIS — K64.4 EXTERNAL HEMORRHOIDS: Primary | ICD-10-CM

## 2020-04-09 DIAGNOSIS — Z23 NEED FOR TDAP VACCINATION: ICD-10-CM

## 2020-04-09 PROCEDURE — 90471 IMMUNIZATION ADMIN: CPT | Performed by: PEDIATRICS

## 2020-04-09 PROCEDURE — 90715 TDAP VACCINE 7 YRS/> IM: CPT | Performed by: PEDIATRICS

## 2020-04-09 PROCEDURE — 99214 OFFICE O/P EST MOD 30 MIN: CPT | Mod: 25 | Performed by: PEDIATRICS

## 2020-04-09 ASSESSMENT — MIFFLIN-ST. JEOR: SCORE: 1190.87

## 2020-04-09 NOTE — PROGRESS NOTES
Subjective     Marta Quintanilla is a 24 year old female who presents to clinic today for the following health issues:    HPI   Hemorrhoids  Onset: x1.5 months     Description:   Pain: YES  Itching: no     Accompanying Signs & Symptoms:  Blood streaked toilet paper: YES  Blood in stool: YES  Changes in stool pattern: no     History:   Any previous GI studies done:none  Family History of colon cancer: no     Precipitating factors:   None    Alleviating factors:  None    Therapies Tried and outcome: preparation H-with some relief     Reviewed Epic (nurse triage note from 4/6/20):  About 1.5 months ago, the Pt reports having a bad UTI. She was seen in the [facility], diagnosed with a UTI but also had some rectal bleeding addressed at that visit as well. She was diagnosed with external hemorrhoid and advised to do hydrocortisone cream to the area and follow up with PCP if not improving.   She has had pretty consistent bleeding since then.      The Pt is able to see the area, and it looks like a swollen, external hemorrhoid protruding out of the anus. It is about the size of her pinky nail. Blood has been bright red.   The Pt reports the bleeding is mostly just during BM's. (toilet water is bright red), however over the last 9 days she has had some bleeding in between BM's.   On a few occasions she noticed drops of blood (small amount) on her underwear in the morning upon waking.   The area is painful to the touch, and also painful while sitting and walking.   She is having 2-3 BM's per day, normal and soft. Not difficult to pass outside of the pain.   No Hx of hemorrhoids before. No recent pregnancy or chronic hx of constipation.   She denies feeling dizzy or lightheaded.      She stopped the topical Cortizone has it seemed to make the bleeding worse. She has been using preparation H (both the topical cream and the wipes).     ______________      Today: has been better the past 2 days after getting over the counter  "Prep H gel.  She is still very concerned though because when active it is painful to sit and sleep.  She is also concerned that is has gotten larger over the past 2 months.  No weight lifting, no previous pregnancy, no constipation.  Sometimes she does \"text on the toilet\" and maybe sits for too long.    Reviewed and updated as needed this visit by Provider         Review of Systems   ROS COMP: Constitutional, HEENT, cardiovascular, pulmonary, gi and gu systems are negative, except as otherwise noted.      Objective    /70 (BP Location: Right arm, Patient Position: Chair, Cuff Size: Adult Regular)   Pulse 96   Temp 98.1  F (36.7  C) (Oral)   Resp 16   Ht 1.575 m (5' 2\")   Wt 48.8 kg (107 lb 8 oz)   SpO2 99%   BMI 19.66 kg/m    Body mass index is 19.66 kg/m .  Physical Exam   GENERAL APPEARANCE: healthy, alert and no distress   (female): pea sized pink external hemorrhoid, non thrombosed, rectal - normal tone, no blood, no internal masses    Diagnostic Test Results:  Labs reviewed in Epic        Assessment & Plan     1. External hemorrhoids  Exam c/w this - improvement temporarily with over the counter medications - given frequent reoccurance and no specific modifiable trigger, recommend removal.  Patient would like this.  - COLORECTAL SURGERY REFERRAL    2. Need for Tdap vaccination  - TDAP, IM (10 - 64 YRS) - Adacel       Patient Instructions     Please continue using Prep H gel as needed for flares.    Referral below for colorectal.  I would recommend seeing if this can be removed since you don't have a clear cause that we can modify.  (except don't sit on toilet for longer than necessary!    Patient Education     Treating Hemorrhoids: Self-Care    Follow your healthcare provider s advice about caring for your hemorrhoids at home. Some treatments help relieve symptoms right away. Others involve making changes in your diet and exercise habits. These can help ease constipation and prevent hemorrhoid " symptoms from coming back.  Relieving symptoms  Your healthcare provider may prescribe anti-inflammatory medicine to help ease your symptoms. The following tips will also help relieve pain and swelling.    Take sitz baths. Taking a sitz bath means sitting in a few inches of warm bath water. Soaking for 10 minutes twice a day can provide welcome relief from painful hemorrhoids. It can also help the area stay clean.    Develop good bowel habits. Use the bathroom when you need to. Don t ignore the urge to move your bowels. This can lead to constipation, hard stools, and straining. Also, don t read while on the toilet. Sit only as long as needed. Wipe gently with soft, unscented toilet tissue or baby wipes.    Use ice packs. Placing an ice pack on a thrombosed external hemorrhoid can help relieve pain right away. It will also help reduce the blood clot. Use the ice for 15 to 20 minutes at a time. Keep a cloth between the ice and your skin to prevent skin damage.    Use other measures. Laxatives and enemas can help ease constipation. But use them only on your healthcare provider s advice. For symptom relief, try using cotton pads soaked in witch hazel. These are available at most drugstores. Over-the-counter hemorrhoid ointments and petroleum jelly can also provide relief.  Add fiber to your diet  Adding fiber to your diet can help relieve constipation by making stools softer and easier to pass. To increase your fiber intake, your healthcare provider may recommend a bulking agent, such as psyllium. This is a high-fiber supplement available at most grocery stores and drugstores. Eating more fiber-rich foods will also help. There are two types of fiber:    Insoluble fiber is the main ingredient in bulking agents. It s also found in foods such as wheat bran, whole-grain breads, fresh fruits, and vegetables.    Soluble fiber is found in foods such as oat bran. Although soluble fiber is good for you, it may not ease  constipation as much as foods high in insoluble fiber.  Drink more water  Along with a high-fiber diet, drinking more water can help ease constipation. This is because insoluble fiber absorbs water, making stools soft and bulky. Be sure to drink plenty of water throughout the day. Drinking fruit juices, such as prune juice or apple juice, can also help prevent constipation.  Get more exercise  Regular exercise aids digestion and helps prevent constipation. It s also great for your health. So talk with your healthcare provider about starting an exercise program. Low-impact activities, such as swimming or walking, are good places to start. Take it easy at first. And remember to drink plenty of water when you exercise.  High-fiber foods  High-fiber foods offer many benefits. By making your stools softer, they help heal and prevent swollen hemorrhoids. They may also help reduce the risk of colon and rectal cancer. Best of all, they re usually low in calories and taste great. Here are some examples of fiber-rich foods.    Whole grains, such as wheat bran, corn bran, and brown rice.    Vegetables, especially carrots, broccoli, cabbage, and peas.    Fruits, such as apples, bananas, raisins, peaches, and pears.    Nuts and legumes, especially peanuts, lentils, and kidney beans.  Easy ways to add fiber  The tips below offer some simple ways to add more high-fiber foods to your meals.    Start your day with a high-fiber breakfast. Eat a wheat bran cereal along with a sliced banana. Or, try peanut butter on whole-wheat toast.    Eat carrot sticks for snacks. They re easy to prepare, taste great, and are low in calories.    Use whole-grain breads instead of white bread for sandwiches.    Eat fruits for treats. Try an apple and some raisins instead of a candy bar.   Date Last Reviewed: 7/1/2016 2000-2019 [employer]. [address] [place] [address], [place] [address]. All rights reserved. This information is not intended  as a substitute for professional medical care. Always follow your healthcare professional's instructions.           Patient Education     Treating Hemorrhoids: Removal  If your symptoms persist, your healthcare provider may recommend removing the hemorrhoid. This can be done in your healthcare provider's office or at a surgical center. In most cases, no special preparation is needed. Keep in mind that your treatment may differ depending on your symptoms and the location of the hemorrhoid.           Internal hemorrhoids  You ll be asked to lie or kneel on a table. Your healthcare provider then inserts an anoscope to view the anal canal. To treat the hemorrhoid, your healthcare provider will use one of the methods listed below. Because internal hemorrhoids do not have nerves that sense pain, you won t have too much discomfort. You can often return to your normal routine the same day. If you have many hemorrhoids, you may need repeated treatments.  Banding  The banding method is done by placing tight elastic bands around the base of the hemorrhoid. This cuts off blood supply to the hemorrhoid, causing it to fall off. This usually takes about a week. The area then heals within a few days.  Infrared coagulation  This procedure is done using a small probe that exposes the hemorrhoid to short bursts of infrared light. This seals off the blood vessel, causing it to shrink. Slight bleeding may happen for a few days. The area usually heals within a week or two.  Sclerotherapy  Sclerotherapy is done by injecting a chemical into the tissue around the hemorrhoid. The chemical causes the hemorrhoid to shrink within a few days. Bleeding usually stops in about 24 hours.  Thrombosed external hemorrhoids  Thrombosed external hemorrhoids are often very painful. That s because the swollen hemorrhoid stretches the sensitive skin around it. To relieve the pain, your healthcare provider may remove the blood clot. This takes just a few  minutes. You may need to rest for a few days before returning to work.    Numbing the hemorrhoid. You ll be asked to lie or kneel on a table. The hemorrhoid is then injected with a local anesthetic. This may cause some discomfort for a moment. But within a short time your healthcare provider will be able to remove the hemorrhoid without causing pain.    Removing the hemorrhoid. A small incision is made to remove the blood clot. The hemorrhoid may also be removed. The skin is then either closed with sutures or left open to heal on its own. The area around the incision will likely be sore for a few days. But your pain should improve soon after the procedure.     Risks and complications  The possible risks and complications include:    Infection    Bleeding    Trouble urinating (Doesn't happen with thrombosed external hemorrhoids)    Narrowing of the anal canal (very rare, doesn't happen with thrombosed external hemorrhoids)  When to call your healthcare provider  After your procedure, call your healthcare provider if you have:    Increasing pain    Fever or chills    Persistent bleeding    Trouble urinating   Date Last Reviewed: 7/1/2016 2000-2019 [employer]. [address] [place] [address]. All rights reserved. This information is not intended as a substitute for professional medical care. Always follow your healthcare professional's instructions.               Return for appointment already scheduled.    Sania Kurtz MD  [facility] (formerly [facility])

## 2020-04-09 NOTE — PATIENT INSTRUCTIONS
Please continue using Prep H gel as needed for flares.    Referral below for colorectal.  I would recommend seeing if this can be removed since you don't have a clear cause that we can modify.  (except don't sit on toilet for longer than necessary!    Patient Education     Treating Hemorrhoids: Self-Care    Follow your healthcare provider s advice about caring for your hemorrhoids at home. Some treatments help relieve symptoms right away. Others involve making changes in your diet and exercise habits. These can help ease constipation and prevent hemorrhoid symptoms from coming back.  Relieving symptoms  Your healthcare provider may prescribe anti-inflammatory medicine to help ease your symptoms. The following tips will also help relieve pain and swelling.    Take sitz baths. Taking a sitz bath means sitting in a few inches of warm bath water. Soaking for 10 minutes twice a day can provide welcome relief from painful hemorrhoids. It can also help the area stay clean.    Develop good bowel habits. Use the bathroom when you need to. Don t ignore the urge to move your bowels. This can lead to constipation, hard stools, and straining. Also, don t read while on the toilet. Sit only as long as needed. Wipe gently with soft, unscented toilet tissue or baby wipes.    Use ice packs. Placing an ice pack on a thrombosed external hemorrhoid can help relieve pain right away. It will also help reduce the blood clot. Use the ice for 15 to 20 minutes at a time. Keep a cloth between the ice and your skin to prevent skin damage.    Use other measures. Laxatives and enemas can help ease constipation. But use them only on your healthcare provider s advice. For symptom relief, try using cotton pads soaked in witch hazel. These are available at most drugstores. Over-the-counter hemorrhoid ointments and petroleum jelly can also provide relief.  Add fiber to your diet  Adding fiber to your diet can help relieve constipation by making stools  softer and easier to pass. To increase your fiber intake, your healthcare provider may recommend a bulking agent, such as psyllium. This is a high-fiber supplement available at most grocery stores and drugstores. Eating more fiber-rich foods will also help. There are two types of fiber:    Insoluble fiber is the main ingredient in bulking agents. It s also found in foods such as wheat bran, whole-grain breads, fresh fruits, and vegetables.    Soluble fiber is found in foods such as oat bran. Although soluble fiber is good for you, it may not ease constipation as much as foods high in insoluble fiber.  Drink more water  Along with a high-fiber diet, drinking more water can help ease constipation. This is because insoluble fiber absorbs water, making stools soft and bulky. Be sure to drink plenty of water throughout the day. Drinking fruit juices, such as prune juice or apple juice, can also help prevent constipation.  Get more exercise  Regular exercise aids digestion and helps prevent constipation. It s also great for your health. So talk with your healthcare provider about starting an exercise program. Low-impact activities, such as swimming or walking, are good places to start. Take it easy at first. And remember to drink plenty of water when you exercise.  High-fiber foods  High-fiber foods offer many benefits. By making your stools softer, they help heal and prevent swollen hemorrhoids. They may also help reduce the risk of colon and rectal cancer. Best of all, they re usually low in calories and taste great. Here are some examples of fiber-rich foods.    Whole grains, such as wheat bran, corn bran, and brown rice.    Vegetables, especially carrots, broccoli, cabbage, and peas.    Fruits, such as apples, bananas, raisins, peaches, and pears.    Nuts and legumes, especially peanuts, lentils, and kidney beans.  Easy ways to add fiber  The tips below offer some simple ways to add more high-fiber foods to your  meals.    Start your day with a high-fiber breakfast. Eat a wheat bran cereal along with a sliced banana. Or, try peanut butter on whole-wheat toast.    Eat carrot sticks for snacks. They re easy to prepare, taste great, and are low in calories.    Use whole-grain breads instead of white bread for sandwiches.    Eat fruits for treats. Try an apple and some raisins instead of a candy bar.   Date Last Reviewed: 7/1/2016 2000-2019 [employer]. [address] [place] [address]. All rights reserved. This information is not intended as a substitute for professional medical care. Always follow your healthcare professional's instructions.           Patient Education     Treating Hemorrhoids: Removal  If your symptoms persist, your healthcare provider may recommend removing the hemorrhoid. This can be done in your healthcare provider's office or at a surgical center. In most cases, no special preparation is needed. Keep in mind that your treatment may differ depending on your symptoms and the location of the hemorrhoid.           Internal hemorrhoids  You ll be asked to lie or kneel on a table. Your healthcare provider then inserts an anoscope to view the anal canal. To treat the hemorrhoid, your healthcare provider will use one of the methods listed below. Because internal hemorrhoids do not have nerves that sense pain, you won t have too much discomfort. You can often return to your normal routine the same day. If you have many hemorrhoids, you may need repeated treatments.  Banding  The banding method is done by placing tight elastic bands around the base of the hemorrhoid. This cuts off blood supply to the hemorrhoid, causing it to fall off. This usually takes about a week. The area then heals within a few days.  Infrared coagulation  This procedure is done using a small probe that exposes the hemorrhoid to short bursts of infrared light. This seals off the blood vessel, causing it to shrink.  Slight bleeding may happen for a few days. The area usually heals within a week or two.  Sclerotherapy  Sclerotherapy is done by injecting a chemical into the tissue around the hemorrhoid. The chemical causes the hemorrhoid to shrink within a few days. Bleeding usually stops in about 24 hours.  Thrombosed external hemorrhoids  Thrombosed external hemorrhoids are often very painful. That s because the swollen hemorrhoid stretches the sensitive skin around it. To relieve the pain, your healthcare provider may remove the blood clot. This takes just a few minutes. You may need to rest for a few days before returning to work.    Numbing the hemorrhoid. You ll be asked to lie or kneel on a table. The hemorrhoid is then injected with a local anesthetic. This may cause some discomfort for a moment. But within a short time your healthcare provider will be able to remove the hemorrhoid without causing pain.    Removing the hemorrhoid. A small incision is made to remove the blood clot. The hemorrhoid may also be removed. The skin is then either closed with sutures or left open to heal on its own. The area around the incision will likely be sore for a few days. But your pain should improve soon after the procedure.     Risks and complications  The possible risks and complications include:    Infection    Bleeding    Trouble urinating (Doesn't happen with thrombosed external hemorrhoids)    Narrowing of the anal canal (very rare, doesn't happen with thrombosed external hemorrhoids)  When to call your healthcare provider  After your procedure, call your healthcare provider if you have:    Increasing pain    Fever or chills    Persistent bleeding    Trouble urinating   Date Last Reviewed: 7/1/2016 2000-2019 [employer]. [address] [place] [address], [place] [address]. All rights reserved. This information is not intended as a substitute for professional medical care. Always follow your healthcare professional's  instructions.

## 2020-04-14 PROBLEM — M79.642 BILATERAL HAND PAIN: Status: RESOLVED | Noted: 2019-02-26 | Resolved: 2020-04-14

## 2020-04-14 PROBLEM — M79.641 BILATERAL HAND PAIN: Status: RESOLVED | Noted: 2019-02-26 | Resolved: 2020-04-14

## 2020-04-30 NOTE — TELEPHONE ENCOUNTER
RECORDS RECEIVED FROM: Internal    DATE RECEIVED: 6/11/20 9:15AM    NOTES STATUS DETAILS   OFFICE NOTE from referring provider  Internal Internal recs in epic    OFFICE NOTE from other specialist   N/A    DISCHARGE SUMMARY from hospital  Internal ED note 2/24/20    DISCHARGE REPORT from the ER Internal    MEDICATION LIST Internal

## 2020-05-11 ENCOUNTER — TRANSFERRED RECORDS (OUTPATIENT)
Dept: HEALTH INFORMATION MANAGEMENT | Facility: CLINIC | Age: 25
End: 2020-05-11

## 2020-06-02 ENCOUNTER — TELEPHONE (OUTPATIENT)
Dept: SURGERY | Facility: CLINIC | Age: 25
End: 2020-06-02

## 2020-06-11 ENCOUNTER — PRE VISIT (OUTPATIENT)
Dept: SURGERY | Facility: CLINIC | Age: 25
End: 2020-06-11

## 2020-06-15 ENCOUNTER — TELEPHONE (OUTPATIENT)
Dept: PEDIATRICS | Facility: CLINIC | Age: 25
End: 2020-06-15

## 2020-06-15 NOTE — TELEPHONE ENCOUNTER
Called and lvm to call back the clinic. When and if pt does please reschedule physical for anytime after July.

## 2020-06-22 ENCOUNTER — TRANSFERRED RECORDS (OUTPATIENT)
Dept: HEALTH INFORMATION MANAGEMENT | Facility: CLINIC | Age: 25
End: 2020-06-22

## 2020-07-09 ENCOUNTER — TELEPHONE (OUTPATIENT)
Dept: PEDIATRICS | Facility: CLINIC | Age: 25
End: 2020-07-09

## 2020-07-09 NOTE — TELEPHONE ENCOUNTER
General Call:   Who is calling:  Patient   Reason for Call:  Pt is needing to reschedule her appt on 7/20  What are your questions or concerns:  Attempted to find appt with pcp or another female provider on their in clinic time, and there is nothing available. Spoke with Alea qiu [facility], who advised me to send a message to team.   Date of last appointment with provider: 4/9/20  Okay to leave a detailed message:Yes at Home number on file [phone] (home)

## 2020-08-12 ENCOUNTER — OFFICE VISIT (OUTPATIENT)
Dept: INTERNAL MEDICINE | Facility: CLINIC | Age: 25
End: 2020-08-12
Payer: COMMERCIAL

## 2020-08-12 VITALS
BODY MASS INDEX: 20.12 KG/M2 | HEIGHT: 61 IN | WEIGHT: 106.6 LBS | SYSTOLIC BLOOD PRESSURE: 110 MMHG | DIASTOLIC BLOOD PRESSURE: 76 MMHG | RESPIRATION RATE: 16 BRPM | OXYGEN SATURATION: 98 % | HEART RATE: 87 BPM

## 2020-08-12 DIAGNOSIS — Z00.00 ROUTINE HISTORY AND PHYSICAL EXAMINATION OF ADULT: Primary | ICD-10-CM

## 2020-08-12 DIAGNOSIS — F32.81 PMDD (PREMENSTRUAL DYSPHORIC DISORDER): ICD-10-CM

## 2020-08-12 PROCEDURE — 99395 PREV VISIT EST AGE 18-39: CPT | Performed by: INTERNAL MEDICINE

## 2020-08-12 RX ORDER — SERTRALINE HYDROCHLORIDE 25 MG/1
25 TABLET, FILM COATED ORAL DAILY
Qty: 90 TABLET | Refills: 3 | Status: SHIPPED | OUTPATIENT
Start: 2020-08-12

## 2020-08-12 RX ORDER — KETOCONAZOLE 20 MG/ML
SHAMPOO TOPICAL
COMMUNITY
Start: 2020-07-29

## 2020-08-12 RX ORDER — HYDROCORTISONE ACETATE PRAMOXINE HCL 2.5; 1 G/100G; G/100G
CREAM TOPICAL
COMMUNITY
Start: 2020-02-24

## 2020-08-12 SDOH — HEALTH STABILITY: MENTAL HEALTH: HOW OFTEN DO YOU HAVE A DRINK CONTAINING ALCOHOL?: 2-3 TIMES A WEEK

## 2020-08-12 SDOH — HEALTH STABILITY: MENTAL HEALTH: HOW OFTEN DO YOU HAVE 6 OR MORE DRINKS ON ONE OCCASION?: NEVER

## 2020-08-12 SDOH — HEALTH STABILITY: MENTAL HEALTH: HOW MANY STANDARD DRINKS CONTAINING ALCOHOL DO YOU HAVE ON A TYPICAL DAY?: 1 OR 2

## 2020-08-12 ASSESSMENT — ANXIETY QUESTIONNAIRES
GAD7 TOTAL SCORE: 14
7. FEELING AFRAID AS IF SOMETHING AWFUL MIGHT HAPPEN: NEARLY EVERY DAY
2. NOT BEING ABLE TO STOP OR CONTROL WORRYING: NEARLY EVERY DAY
6. BECOMING EASILY ANNOYED OR IRRITABLE: SEVERAL DAYS
1. FEELING NERVOUS, ANXIOUS, OR ON EDGE: MORE THAN HALF THE DAYS
IF YOU CHECKED OFF ANY PROBLEMS ON THIS QUESTIONNAIRE, HOW DIFFICULT HAVE THESE PROBLEMS MADE IT FOR YOU TO DO YOUR WORK, TAKE CARE OF THINGS AT HOME, OR GET ALONG WITH OTHER PEOPLE: SOMEWHAT DIFFICULT
5. BEING SO RESTLESS THAT IT IS HARD TO SIT STILL: NOT AT ALL
3. WORRYING TOO MUCH ABOUT DIFFERENT THINGS: NEARLY EVERY DAY

## 2020-08-12 ASSESSMENT — PATIENT HEALTH QUESTIONNAIRE - PHQ9
5. POOR APPETITE OR OVEREATING: MORE THAN HALF THE DAYS
SUM OF ALL RESPONSES TO PHQ QUESTIONS 1-9: 8

## 2020-08-12 ASSESSMENT — MIFFLIN-ST. JEOR: SCORE: 1165.91

## 2020-08-12 NOTE — PROGRESS NOTES
SUBJECTIVE                                                      HPI: Marta Quintanilla is a pleasant 25 year old female who presents for a physical.    Patient has been struggling with anxiety and depression for the last several months. Reports chronic, mild anxiety and depression but recent stressors (COVID-19, loss of work) have exacerbated her mood symptoms - anxiety much more than depression. Patient reports that her anxiety is significantly worse before and during her. Intermittent otherwise.    Patient was treated with Zoloft for depression for 3 years as a teenager. Tolerated well - no adverse side effects.    ROS:  Constitutional: denies unintentional weight loss or gain; denies fevers, chills, or sweats     Cardiovascular: denies chest pain, palpitations, or edema  Respiratory: denies cough, wheezing, shortness of breath, or dyspnea on exertion  Gastrointestinal: denies nausea, vomiting, constipation, diarrhea, or abdominal pain  Genitourinary: denies urinary frequency, urgency, dysuria, or hematuria  Integumentary: denies rash or pruritus  Musculoskeletal: denies back pain, muscle pain, joint pain, or joint swelling  Neurologic: denies focal weakness, numbness, or tingling  Hematologic/Immunologic: denies history of anemia or blood transfusions  Endocrine: denies heat or cold intolerance; denies polyuria, polydipsia  Psychiatric: see above    Past Medical History:   Diagnosis Date     No pertinent past medical history      Past Surgical History:   Procedure Laterality Date     LASIK BILATERAL       MYRINGOTOMY, INSERT TUBE, COMBINED       Family History   Problem Relation Age of Onset     Depression Mother      Anxiety Disorder Sister      Depression Brother      Hypertension Maternal Grandmother      Hyperlipidemia Maternal Grandmother      Hyperlipidemia Maternal Grandfather      Lymphoma Maternal Grandfather      Cerebrovascular Disease Paternal Grandmother         later in life     Breast Cancer  "Paternal Aunt      Diabetes No family hx of      Myocardial Infarction No family hx of      Coronary Artery Disease Early Onset No family hx of      Ovarian Cancer No family hx of      Colon Cancer No family hx of      Social History     Occupational History     Occupation:    Tobacco Use     Smoking status: Never Smoker     Smokeless tobacco: Never Used   Substance and Sexual Activity     Alcohol use: Yes     Frequency: 2-3 times a week     Drinks per session: 1 or 2     Binge frequency: Never     Drug use: No     Sexual activity: Yes     Partners: Male     Birth control/protection: Condom   Social History Narrative    In a relationship.    No kids.    15-30 minutes of exercise/daily.      No Known Allergies     Current Outpatient Medications   Medication Sig     hydrocortisone-pramoxine 2.5-1 % CREA      ketoconazole (NIZORAL) 2 % external shampoo      Immunization History   Administered Date(s) Administered     HEPA 07/05/2007, 01/07/2008     HPV 07/05/2007, 09/06/2007, 01/07/2008     Influenza (IIV3) PF 11/04/2010     Meningococcal (Menomune ) 07/05/2007     TDAP Vaccine (Adacel) 02/21/2019, 04/09/2020     Tdap (Adacel,Boostrix) 07/05/2007     OBJECTIVE                                                      /76   Pulse 87   Resp 16   Ht 1.549 m (5' 1\")   Wt 48.4 kg (106 lb 9.6 oz)   LMP 08/11/2020 (Exact Date)   SpO2 98%   BMI 20.14 kg/m    Constitutional: well-appearing  Eyes: normal conjunctivae and lids; pupils equal, round, and reactive to light  Ears, Nose, Mouth, and Throat: normal ears and nose; tympanic membranes visualized and normal; normal lips, teeth, and gums; no oropharyngeal lesions or ulcers  Neck: supple and symmetric; no lymphadenopathy; no thyromegaly or masses  Respiratory: normal respiratory effort; clear to auscultation bilaterally  Cardiovascular: regular rate and rhythm; no edema  Gastrointestinal: soft, non-tender, non-distended, and bowel sounds present; no " organomegaly or masses  Musculoskeletal: normal gait and station  Psych: normal judgment and insight; normal mood and affect; recent and remote memory intact; oriented to time, place, and person    PREVENTATIVE HEALTH                                                      BMI: within normal limits   Blood pressure: within normal limits   Breast CA screening: not medically indicated at this time   Cervical CA screening: DUE - menstruating today  Colon CA screening: not medically indicated at this time   Lung CA screening: n/a   Dexa:  Not medically indicated at this time   Screening HCV: n/a   Screening HIV: DUE - can perform with future labs  Screening cholesterol: not medically indicated at this time   Screening diabetes: not medically indicated at this time   STD testing: no risk factors present  Alcohol misuse screening: alcohol use reviewed - no intervention indicated at this time  Immunizations: reviewed; up to date     ASSESSMENT/PLAN                                                       (Z00.00) Routine history and physical examination of adult  (primary encounter diagnosis)  Comment: PMH, PSH, FH, SH, medications, allergies, immunizations, and preventative health measures reviewed.   Plan: patient will return for Pap smear when able.    (F32.81) PMDD (premenstrual dysphoric disorder)  Plan: recommend trial of Zoloft; follow-up in 6 to 8 weeks (earlier as needed).    The instructions on the AVS were discussed and explained to the patient. Patient expressed understanding of instructions.    (Chart documentation was completed, in part, with [employer] voice-recognition software. Even though reviewed, some grammatical, spelling, and word errors may remain.)    Winsome Dye MD   [address] [address]  T: [phone], F: [phone]

## 2020-08-12 NOTE — PATIENT INSTRUCTIONS
START Zoloft - can try half tab daily or one tab every other day for the first week or so. Then increase to a full tab daily after that.    Follow-up in 6-8 weeks (EVisit or virtual visit okay).    Pap smear when able.

## 2020-08-13 ASSESSMENT — ANXIETY QUESTIONNAIRES: GAD7 TOTAL SCORE: 14

## 2020-08-31 ENCOUNTER — TELEPHONE (OUTPATIENT)
Dept: PEDIATRICS | Facility: CLINIC | Age: 25
End: 2020-08-31

## 2020-08-31 DIAGNOSIS — N94.9 VAGINAL SYMPTOM: Primary | ICD-10-CM

## 2020-09-01 RX ORDER — FLUCONAZOLE 150 MG/1
150 TABLET ORAL ONCE
Qty: 1 TABLET | Refills: 0 | Status: SHIPPED | OUTPATIENT
Start: 2020-09-01 | End: 2020-09-01

## 2020-09-01 NOTE — TELEPHONE ENCOUNTER
Medication Question or Refill    Who is calling: Pt    What medication are you calling about (include dose and sig)?: fluconazole (DIFLUCAN) 150 MG tablet     Controlled Substance Agreement on file: No    Who prescribed the medication?: [place]    Do you need a refill? Yes: having same symptoms, started 4 days ago, used monistat OTC but still having symptoms.      When did you use the medication last? Back in March    Patient offered an appointment? No    Do you have any questions or concerns?  Yes: Pt requesting to not have to do OV since same symptoms as previous.  Hoping to just have refill for re-treat if possible.      Requested Pharmacy: [id number] - [place], [place] - [address] AT [address] to leave a detailed message?: Yes at Cell number on file:    Telephone Information:   Mobile [phone]                       
Please advise per below.    Lorna HODGSON RN, BSN   
Pt informed.  
Rx sent in. Please let patient know. Thank you.   
denies all

## 2021-01-15 ENCOUNTER — HEALTH MAINTENANCE LETTER (OUTPATIENT)
Age: 26
End: 2021-01-15

## 2021-09-04 ENCOUNTER — HEALTH MAINTENANCE LETTER (OUTPATIENT)
Age: 26
End: 2021-09-04

## 2021-10-30 ENCOUNTER — HEALTH MAINTENANCE LETTER (OUTPATIENT)
Age: 26
End: 2021-10-30

## 2022-10-16 ENCOUNTER — HEALTH MAINTENANCE LETTER (OUTPATIENT)
Age: 27
End: 2022-10-16

## 2022-12-04 ENCOUNTER — HEALTH MAINTENANCE LETTER (OUTPATIENT)
Age: 27
End: 2022-12-04

## 2024-01-13 ENCOUNTER — HEALTH MAINTENANCE LETTER (OUTPATIENT)
Age: 29
End: 2024-01-13